# Patient Record
Sex: MALE | Race: WHITE | NOT HISPANIC OR LATINO | Employment: FULL TIME | URBAN - METROPOLITAN AREA
[De-identification: names, ages, dates, MRNs, and addresses within clinical notes are randomized per-mention and may not be internally consistent; named-entity substitution may affect disease eponyms.]

---

## 2023-02-23 PROBLEM — M25.562 BILATERAL KNEE PAIN: Status: ACTIVE | Noted: 2023-02-23

## 2023-02-23 PROBLEM — K63.5 POLYP OF COLON: Status: ACTIVE | Noted: 2023-02-23

## 2023-02-23 PROBLEM — R63.4 ABNORMAL WEIGHT LOSS: Status: ACTIVE | Noted: 2023-02-23

## 2023-02-23 PROBLEM — M25.561 BILATERAL KNEE PAIN: Status: ACTIVE | Noted: 2023-02-23

## 2023-02-23 PROBLEM — R11.2 NAUSEA WITH VOMITING: Status: ACTIVE | Noted: 2023-02-23

## 2023-02-23 PROBLEM — R05.9 COUGH: Status: ACTIVE | Noted: 2023-02-23

## 2023-02-23 PROBLEM — J20.9 ACUTE BRONCHITIS: Status: ACTIVE | Noted: 2023-02-23

## 2023-02-23 PROBLEM — S20.211S RIB CONTUSION, RIGHT, SEQUELA: Status: ACTIVE | Noted: 2023-02-23

## 2023-02-23 PROBLEM — K21.9 GERD (GASTROESOPHAGEAL REFLUX DISEASE): Status: ACTIVE | Noted: 2023-02-23

## 2023-02-23 PROBLEM — Z85.118 HISTORY OF LUNG CANCER: Status: ACTIVE | Noted: 2023-02-23

## 2023-02-23 PROBLEM — M54.9 BACK PAIN: Status: ACTIVE | Noted: 2023-02-23

## 2023-02-23 PROBLEM — J06.9 URI WITH COUGH AND CONGESTION: Status: ACTIVE | Noted: 2023-02-23

## 2023-02-23 PROBLEM — J43.9 COPD (CHRONIC OBSTRUCTIVE PULMONARY DISEASE) WITH EMPHYSEMA (MULTI): Status: ACTIVE | Noted: 2023-02-23

## 2023-02-23 PROBLEM — R10.9 ABDOMINAL PAIN OF MULTIPLE SITES: Status: ACTIVE | Noted: 2023-02-23

## 2023-02-23 PROBLEM — J45.909 ASTHMA (HHS-HCC): Status: ACTIVE | Noted: 2023-02-23

## 2023-02-23 PROBLEM — R68.89 FLU-LIKE SYMPTOMS: Status: ACTIVE | Noted: 2023-02-23

## 2023-02-23 PROBLEM — R09.81 NASAL CONGESTION: Status: ACTIVE | Noted: 2023-02-23

## 2023-02-23 PROBLEM — C34.90 LUNG CANCER (MULTI): Status: ACTIVE | Noted: 2023-02-23

## 2023-02-23 PROBLEM — R39.15 URINARY URGENCY: Status: ACTIVE | Noted: 2023-02-23

## 2023-02-23 RX ORDER — OMEPRAZOLE 20 MG/1
20 CAPSULE, DELAYED RELEASE ORAL
COMMUNITY
Start: 2022-03-24

## 2023-02-23 RX ORDER — LIDOCAINE 560 MG/1
PATCH PERCUTANEOUS; TOPICAL; TRANSDERMAL
COMMUNITY
End: 2024-01-23 | Stop reason: ALTCHOICE

## 2023-02-23 RX ORDER — FLUTICASONE FUROATE, UMECLIDINIUM BROMIDE AND VILANTEROL TRIFENATATE 200; 62.5; 25 UG/1; UG/1; UG/1
1 POWDER RESPIRATORY (INHALATION)
COMMUNITY
Start: 2022-06-27

## 2023-02-23 RX ORDER — MELOXICAM 15 MG/1
15 TABLET ORAL DAILY PRN
COMMUNITY
End: 2023-03-10

## 2023-03-10 ENCOUNTER — OFFICE VISIT (OUTPATIENT)
Dept: PRIMARY CARE | Facility: CLINIC | Age: 63
End: 2023-03-10
Payer: COMMERCIAL

## 2023-03-10 VITALS
HEART RATE: 91 BPM | HEIGHT: 72 IN | DIASTOLIC BLOOD PRESSURE: 79 MMHG | TEMPERATURE: 98.3 F | WEIGHT: 138 LBS | SYSTOLIC BLOOD PRESSURE: 133 MMHG | BODY MASS INDEX: 18.69 KG/M2 | OXYGEN SATURATION: 96 %

## 2023-03-10 DIAGNOSIS — S20.211S RIB CONTUSION, RIGHT, SEQUELA: Primary | ICD-10-CM

## 2023-03-10 PROCEDURE — 99212 OFFICE O/P EST SF 10 MIN: CPT | Performed by: STUDENT IN AN ORGANIZED HEALTH CARE EDUCATION/TRAINING PROGRAM

## 2023-03-10 RX ORDER — LIDOCAINE 560 MG/1
PATCH PERCUTANEOUS; TOPICAL; TRANSDERMAL
COMMUNITY
Start: 2023-02-21 | End: 2024-01-23 | Stop reason: ALTCHOICE

## 2023-03-10 ASSESSMENT — ENCOUNTER SYMPTOMS
WEAKNESS: 0
ABDOMINAL PAIN: 0
COUGH: 0
PALPITATIONS: 0
FEVER: 0
FATIGUE: 0
CHILLS: 0
VOMITING: 0
SHORTNESS OF BREATH: 0
NUMBNESS: 0
WHEEZING: 0
CHEST TIGHTNESS: 0
NAUSEA: 0

## 2023-03-10 NOTE — PROGRESS NOTES
Subjective   Patient ID: Marshall Silva is a 62 y.o. male who presents for Rib Injury (No longer [facility] case. /DOI 02/09/2023 Right side./No pain or concerns at moment. ).    HPI   Here for follow up on right sided rib pain.   Pain is almost fully resolved. He is back to work, taking it easy when needed.   Denies chest pain, SOB, weakness.     Review of Systems   Constitutional:  Negative for chills, fatigue and fever.   Respiratory:  Negative for cough, chest tightness, shortness of breath and wheezing.    Cardiovascular:  Negative for chest pain and palpitations.   Gastrointestinal:  Negative for abdominal pain, nausea and vomiting.   Musculoskeletal:         Mild intermittent right rib pain   Neurological:  Negative for weakness and numbness.       Objective   /79 (BP Location: Right arm, Patient Position: Sitting, BP Cuff Size: Adult)   Pulse 91   Temp 36.8 °C (98.3 °F) (Temporal)   Ht 1.829 m (6')   Wt 62.6 kg (138 lb)   SpO2 96%   BMI 18.72 kg/m²     Physical Exam  Vitals and nursing note reviewed.   Constitutional:       General: He is not in acute distress.     Appearance: Normal appearance. He is not ill-appearing or toxic-appearing.   HENT:      Head: Normocephalic and atraumatic.   Cardiovascular:      Rate and Rhythm: Normal rate and regular rhythm.      Pulses: Normal pulses.      Heart sounds: Normal heart sounds.   Pulmonary:      Effort: Pulmonary effort is normal. No respiratory distress.      Breath sounds: Normal breath sounds. No wheezing, rhonchi or rales.   Chest:      Chest wall: No tenderness.   Abdominal:      General: Abdomen is flat.      Palpations: Abdomen is soft.   Musculoskeletal:         General: Normal range of motion.      Cervical back: Normal range of motion and neck supple.   Skin:     General: Skin is warm and dry.   Neurological:      General: No focal deficit present.      Mental Status: He is alert and oriented to person, place, and time. Mental status is at  baseline.      Cranial Nerves: No cranial nerve deficit.      Sensory: No sensory deficit.      Motor: No weakness.   Psychiatric:         Mood and Affect: Mood normal.         Behavior: Behavior normal.         Assessment/Plan   Diagnoses and all orders for this visit:  Rib contusion, right, sequela  - Pt doing well following injury to right ribcage. On exam today strength, sensation and ROM. Pt is back to work, will clear for full duty. To take it easy as needed if pain returns.   Follow up as needed with concerns.   Discussed indications to present to the ED such as chest pain, SOB, worsening pain, bowel or bladder incontinence, saddle paraesthesia, lower extremity weakness, or any concerning symptom he may have.

## 2023-03-10 NOTE — PROGRESS NOTES
I reviewed with the resident the medical history and the resident’s findings on physical examination.  I discussed with the resident the patient’s diagnosis and concur with the treatment plan as documented in the resident note.     Isaac Guerrero, DO

## 2023-03-16 ENCOUNTER — TELEPHONE (OUTPATIENT)
Dept: PRIMARY CARE | Facility: CLINIC | Age: 63
End: 2023-03-16
Payer: COMMERCIAL

## 2023-03-16 DIAGNOSIS — M25.562 CHRONIC PAIN OF BOTH KNEES: Primary | ICD-10-CM

## 2023-03-16 DIAGNOSIS — G89.29 CHRONIC PAIN OF BOTH KNEES: Primary | ICD-10-CM

## 2023-03-16 DIAGNOSIS — M25.561 CHRONIC PAIN OF BOTH KNEES: Primary | ICD-10-CM

## 2023-03-17 NOTE — TELEPHONE ENCOUNTER
At last visit, patient complained of b/l knee pain with concern for OA. Discussed possible knee xrays. Pt called asking for orders for imaging. Orders placed.    Ratna Harris, DO

## 2023-03-20 ENCOUNTER — TELEPHONE (OUTPATIENT)
Dept: PRIMARY CARE | Facility: CLINIC | Age: 63
End: 2023-03-20
Payer: COMMERCIAL

## 2023-03-23 NOTE — TELEPHONE ENCOUNTER
----- Message from Dillon Mayorga DO sent at 3/18/2023 11:11 AM EDT -----  X-ray showed mild osteoarthritis in the bilateral knees

## 2023-03-24 ENCOUNTER — OFFICE VISIT (OUTPATIENT)
Dept: PRIMARY CARE | Facility: CLINIC | Age: 63
End: 2023-03-24
Payer: COMMERCIAL

## 2023-03-24 VITALS
HEART RATE: 97 BPM | HEIGHT: 72 IN | OXYGEN SATURATION: 93 % | BODY MASS INDEX: 18.56 KG/M2 | DIASTOLIC BLOOD PRESSURE: 78 MMHG | SYSTOLIC BLOOD PRESSURE: 120 MMHG | TEMPERATURE: 98.2 F | WEIGHT: 137 LBS

## 2023-03-24 DIAGNOSIS — M17.0 PRIMARY OSTEOARTHRITIS OF BOTH KNEES: ICD-10-CM

## 2023-03-24 DIAGNOSIS — M25.562 CHRONIC PAIN OF BOTH KNEES: Primary | ICD-10-CM

## 2023-03-24 DIAGNOSIS — G89.29 CHRONIC PAIN OF BOTH KNEES: Primary | ICD-10-CM

## 2023-03-24 DIAGNOSIS — M25.561 CHRONIC PAIN OF BOTH KNEES: Primary | ICD-10-CM

## 2023-03-24 PROCEDURE — 20611 DRAIN/INJ JOINT/BURSA W/US: CPT | Performed by: STUDENT IN AN ORGANIZED HEALTH CARE EDUCATION/TRAINING PROGRAM

## 2023-03-24 PROCEDURE — 99213 OFFICE O/P EST LOW 20 MIN: CPT | Performed by: STUDENT IN AN ORGANIZED HEALTH CARE EDUCATION/TRAINING PROGRAM

## 2023-03-24 RX ORDER — METHYLPREDNISOLONE ACETATE 40 MG/ML
80 INJECTION, SUSPENSION INTRA-ARTICULAR; INTRALESIONAL; INTRAMUSCULAR; SOFT TISSUE
Status: COMPLETED | OUTPATIENT
Start: 2023-03-24 | End: 2023-03-24

## 2023-03-24 RX ORDER — LIDOCAINE HYDROCHLORIDE 20 MG/ML
3 INJECTION, SOLUTION INFILTRATION; PERINEURAL
Status: COMPLETED | OUTPATIENT
Start: 2023-03-24 | End: 2023-03-24

## 2023-03-24 RX ADMIN — METHYLPREDNISOLONE ACETATE 80 MG: 40 INJECTION, SUSPENSION INTRA-ARTICULAR; INTRALESIONAL; INTRAMUSCULAR; SOFT TISSUE at 17:40

## 2023-03-24 RX ADMIN — LIDOCAINE HYDROCHLORIDE 3 ML: 20 INJECTION, SOLUTION INFILTRATION; PERINEURAL at 17:40

## 2023-03-24 ASSESSMENT — ENCOUNTER SYMPTOMS
DIZZINESS: 0
AGITATION: 0
SHORTNESS OF BREATH: 0
CHILLS: 0
COUGH: 0
FATIGUE: 0
FEVER: 0
STRIDOR: 0
BRUISES/BLEEDS EASILY: 0
LIGHT-HEADEDNESS: 0
WHEEZING: 0
ARTHRALGIAS: 1
WEAKNESS: 0
NUMBNESS: 0
CONFUSION: 0
JOINT SWELLING: 0

## 2023-03-24 NOTE — PROGRESS NOTES
Subjective   Patient ID: Marshall Silva is a 62 y.o. male who presents for Knee Pain (Patient feels knee is unstable /Pain when going up stairs. ) and Hip Pain (Right hip).    Patient presenting for follow-up for bilateral knee pain.  States he has had pain for several months to years and has been worsening recently.  States it is worse when standing for long periods of time or going up steps.  He states he occasionally feels unsteady on his knees as well.  Denies any new trauma to the knees or any catching.  He denies any significant swelling over knees bilaterally.  Underwent x-rays of knees which revealed mild osteoarthritis bilaterally without any other osseous abnormalities.  Patient has not been taking medications for his symptoms nor has he been following any specific exercise regimen at this time.  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  Denies any new numbness, tingling, or weakness in bilateral lower extremities.         Review of Systems   Constitutional:  Negative for chills, fatigue and fever.   Eyes:  Negative for visual disturbance.   Respiratory:  Negative for cough, shortness of breath, wheezing and stridor.    Cardiovascular:  Negative for chest pain and leg swelling.   Musculoskeletal:  Positive for arthralgias. Negative for joint swelling.   Skin:  Negative for rash.   Neurological:  Negative for dizziness, weakness, light-headedness and numbness.   Hematological:  Does not bruise/bleed easily.   Psychiatric/Behavioral:  Negative for agitation, behavioral problems and confusion.        Objective   /78 (BP Location: Right arm, Patient Position: Sitting, BP Cuff Size: Adult)   Pulse 97   Temp 36.8 °C (98.2 °F) (Temporal)   Ht 1.829 m (6')   Wt 62.1 kg (137 lb)   SpO2 93%   BMI 18.58 kg/m²     Physical Exam  Vitals and nursing note reviewed.   Constitutional:       General: He is not in acute  distress.     Appearance: Normal appearance. He is not toxic-appearing.   HENT:      Head: Normocephalic and atraumatic.      Right Ear: External ear normal.      Left Ear: External ear normal.      Nose: Nose normal.      Mouth/Throat:      Mouth: Mucous membranes are moist.      Pharynx: Oropharynx is clear.   Eyes:      Extraocular Movements: Extraocular movements intact.      Conjunctiva/sclera: Conjunctivae normal.      Pupils: Pupils are equal, round, and reactive to light.   Cardiovascular:      Rate and Rhythm: Normal rate and regular rhythm.      Pulses: Normal pulses.      Heart sounds: Normal heart sounds.   Pulmonary:      Effort: Pulmonary effort is normal.      Breath sounds: Normal breath sounds.   Abdominal:      General: Abdomen is flat. There is no distension.      Palpations: Abdomen is soft.   Musculoskeletal:         General: No swelling or signs of injury.      Cervical back: Normal range of motion.      Right lower leg: No edema.      Left lower leg: No edema.      Comments: Pain with ambulation and knees bilaterally.  Range of motion and deep flexion slightly limited due to pain in both knees. Normal strength and sensation in bilateral lower extremities. No significant ligamentous laxity on physical exam.   Skin:     General: Skin is warm and dry.      Coloration: Skin is not jaundiced or pale.   Neurological:      General: No focal deficit present.      Mental Status: He is alert and oriented to person, place, and time.   Psychiatric:         Mood and Affect: Mood normal.         Behavior: Behavior normal.         Thought Content: Thought content normal.         Judgment: Judgment normal.         Assessment/Plan   Problem List Items Addressed This Visit          Other    Bilateral knee pain - Primary    Relevant Orders    Referral to Physical Therapy    Joint Injection Large/Arthrocentesis: bilateral knee     Other Visit Diagnoses       Primary osteoarthritis of both knees        Relevant  Orders    Referral to Physical Therapy    Joint Injection Large/Arthrocentesis: bilateral knee        Patient ID: Marshall Silva is a 62 y.o. male.      Joint Injection Large/Arthrocentesis: bilateral knee on 3/24/2023 5:40 PM  Indications: pain  Details: 22 G needle, ultrasound-guided superolateral approach  Medications (Right): 80 mg methylPREDNISolone acetate 40 mg/mL; 3 mL lidocaine 20 mg/mL (2 %)  Medications (Left): 80 mg methylPREDNISolone acetate 40 mg/mL; 3 mL lidocaine 20 mg/mL (2 %)  Outcome: tolerated well, no immediate complications  Procedure, treatment alternatives, risks and benefits explained, specific risks discussed. Consent was given by the patient. Patient was prepped and draped in the usual sterile fashion.       X-ray of bilateral knees revealed mild osteoarthritis without other osseous abnormalities.  Discussed conservative management at this time including corticosteroid injections which patient was interested.  Bilateral corticosteroid injections done in office today under ultrasound.  Patient tolerated well.  Physical therapy referral provided for instability of knees bilaterally.  Patient may take Tylenol as needed for knee pain.  Advised patient to follow-up within the next 2 to 4 weeks if no significant improvement of pain as he may benefit from MRI to further investigate instability of knees, though no significant ligamentous laxity appreciated on exam.  Plan for follow-up as needed if pain has improved with current intervention.  Patient should continue to follow-up with physical therapy as recommended by PT.

## 2023-03-25 NOTE — PROGRESS NOTES
I was present for the entirety of the procedure(s).  I saw and evaluated the patient. I personally obtained the key and critical portions of the history and physical exam or was physically present for key and critical portions performed by the resident/fellow. I reviewed the resident/fellow's documentation and discussed the patient with the resident/fellow. I agree with the resident/fellow's medical decision making as documented in the note.    Asa Paulson, DO

## 2024-01-23 ENCOUNTER — TELEPHONE (OUTPATIENT)
Dept: PRIMARY CARE | Facility: CLINIC | Age: 64
End: 2024-01-23

## 2024-01-23 ENCOUNTER — OFFICE VISIT (OUTPATIENT)
Dept: PRIMARY CARE | Facility: CLINIC | Age: 64
End: 2024-01-23
Payer: COMMERCIAL

## 2024-01-23 ENCOUNTER — APPOINTMENT (OUTPATIENT)
Dept: LAB | Facility: LAB | Age: 64
End: 2024-01-23
Payer: COMMERCIAL

## 2024-01-23 VITALS
HEIGHT: 72 IN | DIASTOLIC BLOOD PRESSURE: 63 MMHG | SYSTOLIC BLOOD PRESSURE: 117 MMHG | OXYGEN SATURATION: 99 % | BODY MASS INDEX: 18.01 KG/M2 | WEIGHT: 133 LBS | HEART RATE: 58 BPM | RESPIRATION RATE: 18 BRPM | TEMPERATURE: 98.4 F

## 2024-01-23 DIAGNOSIS — M62.81 PROXIMAL MUSCLE WEAKNESS: ICD-10-CM

## 2024-01-23 DIAGNOSIS — R39.15 URINARY URGENCY: ICD-10-CM

## 2024-01-23 DIAGNOSIS — Z85.118 HISTORY OF LUNG CANCER: ICD-10-CM

## 2024-01-23 DIAGNOSIS — G25.3 MYOCLONUS: ICD-10-CM

## 2024-01-23 DIAGNOSIS — Z87.891 PERSONAL HISTORY OF NICOTINE DEPENDENCE: ICD-10-CM

## 2024-01-23 DIAGNOSIS — M62.89 DECREASED MUSCLE TONE: ICD-10-CM

## 2024-01-23 DIAGNOSIS — Z00.00 PREVENTATIVE HEALTH CARE: Primary | ICD-10-CM

## 2024-01-23 LAB
POC APPEARANCE, URINE: CLEAR
POC BILIRUBIN, URINE: NEGATIVE
POC BLOOD, URINE: NEGATIVE
POC COLOR, URINE: YELLOW
POC GLUCOSE, URINE: NEGATIVE MG/DL
POC KETONES, URINE: NEGATIVE MG/DL
POC LEUKOCYTES, URINE: NEGATIVE
POC NITRITE,URINE: NEGATIVE
POC PH, URINE: 7.5 PH
POC PROTEIN, URINE: NEGATIVE MG/DL
POC SPECIFIC GRAVITY, URINE: 1.01
POC UROBILINOGEN, URINE: 0.2 EU/DL

## 2024-01-23 PROCEDURE — 99214 OFFICE O/P EST MOD 30 MIN: CPT

## 2024-01-23 PROCEDURE — 81002 URINALYSIS NONAUTO W/O SCOPE: CPT

## 2024-01-23 PROCEDURE — 99396 PREV VISIT EST AGE 40-64: CPT

## 2024-01-23 SDOH — ECONOMIC STABILITY: GENERAL
WHICH OF THE FOLLOWING DO YOU KNOW HOW TO USE AND HAVE ACCESS TO EVERY DAY? (CHOOSE ALL THAT APPLY): DESKTOP COMPUTER, LAPTOP COMPUTER, OR TABLET WITH BROADBAND INTERNET CONNECTION;SMARTPHONE WITH CELLULAR DATA PLAN

## 2024-01-23 SDOH — ECONOMIC STABILITY: HOUSING INSECURITY
IN THE LAST 12 MONTHS, WAS THERE A TIME WHEN YOU DID NOT HAVE A STEADY PLACE TO SLEEP OR SLEPT IN A SHELTER (INCLUDING NOW)?: NO

## 2024-01-23 SDOH — ECONOMIC STABILITY: FOOD INSECURITY: WITHIN THE PAST 12 MONTHS, YOU WORRIED THAT YOUR FOOD WOULD RUN OUT BEFORE YOU GOT MONEY TO BUY MORE.: NEVER TRUE

## 2024-01-23 SDOH — HEALTH STABILITY: PHYSICAL HEALTH: ON AVERAGE, HOW MANY MINUTES DO YOU ENGAGE IN EXERCISE AT THIS LEVEL?: 60 MIN

## 2024-01-23 SDOH — ECONOMIC STABILITY: TRANSPORTATION INSECURITY
IN THE PAST 12 MONTHS, HAS LACK OF TRANSPORTATION KEPT YOU FROM MEETINGS, WORK, OR FROM GETTING THINGS NEEDED FOR DAILY LIVING?: NO

## 2024-01-23 SDOH — ECONOMIC STABILITY: FOOD INSECURITY: WITHIN THE PAST 12 MONTHS, THE FOOD YOU BOUGHT JUST DIDN'T LAST AND YOU DIDN'T HAVE MONEY TO GET MORE.: NEVER TRUE

## 2024-01-23 SDOH — HEALTH STABILITY: PHYSICAL HEALTH: ON AVERAGE, HOW MANY DAYS PER WEEK DO YOU ENGAGE IN MODERATE TO STRENUOUS EXERCISE (LIKE A BRISK WALK)?: 7 DAYS

## 2024-01-23 SDOH — ECONOMIC STABILITY: TRANSPORTATION INSECURITY
IN THE PAST 12 MONTHS, HAS THE LACK OF TRANSPORTATION KEPT YOU FROM MEDICAL APPOINTMENTS OR FROM GETTING MEDICATIONS?: NO

## 2024-01-23 SDOH — ECONOMIC STABILITY: INCOME INSECURITY: IN THE LAST 12 MONTHS, WAS THERE A TIME WHEN YOU WERE NOT ABLE TO PAY THE MORTGAGE OR RENT ON TIME?: NO

## 2024-01-23 ASSESSMENT — ENCOUNTER SYMPTOMS
HEADACHES: 0
WEAKNESS: 1
LOSS OF SENSATION IN FEET: 0
OCCASIONAL FEELINGS OF UNSTEADINESS: 0
VOMITING: 0
DYSURIA: 0
NAUSEA: 0
DIARRHEA: 0
JOINT SWELLING: 0
LIGHT-HEADEDNESS: 0
NUMBNESS: 0
SHORTNESS OF BREATH: 0
TREMORS: 0
ABDOMINAL DISTENTION: 0
DEPRESSION: 0
DIZZINESS: 0
DIFFICULTY URINATING: 1
ABDOMINAL PAIN: 0
FEVER: 0
BACK PAIN: 1
CONSTIPATION: 0

## 2024-01-23 ASSESSMENT — SOCIAL DETERMINANTS OF HEALTH (SDOH)
HOW OFTEN DO YOU ATTEND CHURCH OR RELIGIOUS SERVICES?: NEVER
WITHIN THE LAST YEAR, HAVE YOU BEEN HUMILIATED OR EMOTIONALLY ABUSED IN OTHER WAYS BY YOUR PARTNER OR EX-PARTNER?: NO
WITHIN THE LAST YEAR, HAVE TO BEEN RAPED OR FORCED TO HAVE ANY KIND OF SEXUAL ACTIVITY BY YOUR PARTNER OR EX-PARTNER?: NO
IN THE PAST 12 MONTHS, HAS THE ELECTRIC, GAS, OIL, OR WATER COMPANY THREATENED TO SHUT OFF SERVICE IN YOUR HOME?: NO
HOW OFTEN DO YOU ATTENT MEETINGS OF THE CLUB OR ORGANIZATION YOU BELONG TO?: NEVER
DO YOU BELONG TO ANY CLUBS OR ORGANIZATIONS SUCH AS CHURCH GROUPS UNIONS, FRATERNAL OR ATHLETIC GROUPS, OR SCHOOL GROUPS?: NO
WITHIN THE LAST YEAR, HAVE YOU BEEN AFRAID OF YOUR PARTNER OR EX-PARTNER?: NO
IN A TYPICAL WEEK, HOW MANY TIMES DO YOU TALK ON THE PHONE WITH FAMILY, FRIENDS, OR NEIGHBORS?: MORE THAN THREE TIMES A WEEK
WITHIN THE LAST YEAR, HAVE YOU BEEN KICKED, HIT, SLAPPED, OR OTHERWISE PHYSICALLY HURT BY YOUR PARTNER OR EX-PARTNER?: NO
HOW HARD IS IT FOR YOU TO PAY FOR THE VERY BASICS LIKE FOOD, HOUSING, MEDICAL CARE, AND HEATING?: NOT HARD AT ALL

## 2024-01-23 ASSESSMENT — PATIENT HEALTH QUESTIONNAIRE - PHQ9
2. FEELING DOWN, DEPRESSED OR HOPELESS: NOT AT ALL
1. LITTLE INTEREST OR PLEASURE IN DOING THINGS: NOT AT ALL
SUM OF ALL RESPONSES TO PHQ9 QUESTIONS 1 & 2: 0

## 2024-01-23 ASSESSMENT — LIFESTYLE VARIABLES
HOW MANY STANDARD DRINKS CONTAINING ALCOHOL DO YOU HAVE ON A TYPICAL DAY: PATIENT DOES NOT DRINK
HOW OFTEN DO YOU HAVE A DRINK CONTAINING ALCOHOL: NEVER
SKIP TO QUESTIONS 9-10: 1
HOW OFTEN DO YOU HAVE SIX OR MORE DRINKS ON ONE OCCASION: NEVER
AUDIT-C TOTAL SCORE: 0

## 2024-01-23 NOTE — PROGRESS NOTES
Subjective   Marshall Silva is a 63 y.o. male who is here for a routine exam and routine preventative care screening.  He is also here with several acute complaints first and foremost he noticed that over the past year has had progressive weakness in his legs and difficulty walking and standing for extended periods of time.  Says that his legs shake back-and-forth when he rises from seated to standing and when standing on his feet.  He said he has had multiple falls due to this kind of weakness.  He does not take any medications for pain consulted in the past he received an ICS to his knees bilaterally which helped for a small amount of time but does not help with his instability.  He denies numbness, tingling, paresthesias, syncope, saddle anesthesia, loss of bowel or bladder incontinence.  He also reports that he has been having frequent nocturia and incomplete bladder emptying for the past year.  He also would like to discuss smoking cessation as he has a history of lung cancer status post surgical resection.  Also needs CT scan of his lungs to check for nodules.  Says he does not follow with a pulmonologist    Review of Systems   Constitutional:  Negative for fever.   Respiratory:  Negative for shortness of breath.    Cardiovascular:  Negative for chest pain.   Gastrointestinal:  Negative for abdominal distention, abdominal pain, constipation, diarrhea, nausea and vomiting.   Genitourinary:  Positive for difficulty urinating. Negative for decreased urine volume, dysuria and urgency.   Musculoskeletal:  Positive for back pain and gait problem. Negative for joint swelling.   Neurological:  Positive for weakness. Negative for dizziness, tremors, light-headedness, numbness and headaches.   All other systems reviewed and are negative.      Objective   /63 (BP Location: Right arm, Patient Position: Sitting)   Pulse 58   Temp 36.9 °C (98.4 °F)   Resp 18   Ht 1.829 m (6')   Wt 60.3 kg (133 lb)   SpO2 99%    BMI 18.04 kg/m²     Physical Exam  Vitals reviewed.   Constitutional:       General: He is not in acute distress.     Appearance: Normal appearance. He is not toxic-appearing.   HENT:      Head: Normocephalic and atraumatic.      Nose: Nose normal.   Eyes:      Extraocular Movements: Extraocular movements intact.   Cardiovascular:      Rate and Rhythm: Normal rate and regular rhythm.      Heart sounds: No murmur heard.     No friction rub. No gallop.   Pulmonary:      Effort: Pulmonary effort is normal. No respiratory distress.      Breath sounds: Normal breath sounds. No wheezing, rhonchi or rales.   Musculoskeletal:         General: No swelling or tenderness.      Cervical back: Normal range of motion.      Right lower leg: No edema.      Left lower leg: No edema.      Comments: Patient goes from sitting to standing his legs shaking and buckling.  This movement pattern is reproduced with strength testing against resistance. His gait is unsteady with ambulation   Skin:     General: Skin is warm and dry.   Neurological:      General: No focal deficit present.      Mental Status: He is alert.   Psychiatric:         Mood and Affect: Mood normal.         Behavior: Behavior normal.         Assessment/Plan patient has a very complicated medical presentation and concerning past medical history of lung cancer.  Due to his health maintenance screening we have ordered basic lab work CBC, CMP, lipids, TSH, hep C screening, HIV screening, PSA.  His more concerning complaint and presentation of lower extremity weakness and gait instability requires in-depth investigation including broad rheumatologic workup the ordered labs seen below.  Additionally we have ordered stat MRI of the brain and lumbar spine.  He is also been given referral to neurology told him to schedule this appointment as soon as possible.  Have also ordered CT without contrast due to his history of lung cancer status post surgical resection.  Also given  referral to pulmonology follow-up with his history of lung cancer.  We reiterated the urgency of patient getting these workup completed as soon as possible as his presentation is concerning and could be due to a variety of different pathologies.  Problem List Items Addressed This Visit       History of lung cancer    Relevant Orders    CT chest wo IV contrast    MR brain w and wo IV contrast    MR lumbar spine wo IV contrast    Referral to Pulmonology    Urinary urgency    Relevant Orders    Prostate Spec.Ag,Screen    MR brain w and wo IV contrast    MR lumbar spine wo IV contrast    Proximal muscle weakness    Relevant Orders    Hepatitis C antibody    TSH with reflex to Free T4 if abnormal    POCT UA (nonautomated) manually resulted (Completed)    Referral to Physical Therapy    ROSHNI    Sedimentation Rate    Anti-PETEY-1 Antibody IgG    Anti-SSA    Anti-SSB    ANCA-Associated Vasculitis Profile (ANCA,MPO,PR3)    Hepatitis B surface antibody    Cryoglobulin    Vitamin D 25-Hydroxy,Total (for eval of Vitamin D levels)    Anti-Smooth Muscle Antibody    Creatine Kinase    MR brain w and wo IV contrast    MR lumbar spine wo IV contrast    Referral to Neurology     Other Visit Diagnoses       Preventative health care    -  Primary    Relevant Orders    CBC    Comprehensive metabolic panel    Lipid panel    Prostate Spec.Ag,Screen    HIV 1/2 Antigen/Antibody Screen with Reflex to Confirmation    TSH with reflex to Free T4 if abnormal    POCT UA (nonautomated) manually resulted (Completed)    Hepatitis B surface antibody    Personal history of nicotine dependence        Myoclonus        Relevant Orders    MR brain w and wo IV contrast    MR lumbar spine wo IV contrast    Decreased muscle tone        Relevant Orders    MR brain w and wo IV contrast    MR lumbar spine wo IV contrast            Reviewed Social Determinants of health with patient, discussed healthy lifestyle including 150 minutes of physical activity per  week  Ordered/Reviewed baseline labwork   Immunizations Up-to-Date

## 2024-01-24 ENCOUNTER — HOSPITAL ENCOUNTER (OUTPATIENT)
Dept: RADIOLOGY | Facility: CLINIC | Age: 64
Discharge: HOME | End: 2024-01-24
Payer: COMMERCIAL

## 2024-01-24 ENCOUNTER — TELEPHONE (OUTPATIENT)
Dept: PRIMARY CARE | Facility: CLINIC | Age: 64
End: 2024-01-24

## 2024-01-24 ENCOUNTER — LAB (OUTPATIENT)
Dept: LAB | Facility: LAB | Age: 64
End: 2024-01-24
Payer: COMMERCIAL

## 2024-01-24 DIAGNOSIS — M62.89 DECREASED MUSCLE TONE: ICD-10-CM

## 2024-01-24 DIAGNOSIS — M62.81 PROXIMAL MUSCLE WEAKNESS: ICD-10-CM

## 2024-01-24 DIAGNOSIS — Z85.118 HISTORY OF LUNG CANCER: ICD-10-CM

## 2024-01-24 DIAGNOSIS — G25.3 MYOCLONUS: ICD-10-CM

## 2024-01-24 DIAGNOSIS — M51.36 BULGING OF LUMBAR INTERVERTEBRAL DISC: Primary | ICD-10-CM

## 2024-01-24 DIAGNOSIS — R39.15 URINARY URGENCY: ICD-10-CM

## 2024-01-24 DIAGNOSIS — M62.81 PROXIMAL MUSCLE WEAKNESS: Primary | ICD-10-CM

## 2024-01-24 DIAGNOSIS — Z00.00 PREVENTATIVE HEALTH CARE: ICD-10-CM

## 2024-01-24 PROBLEM — M51.369 BULGING OF LUMBAR INTERVERTEBRAL DISC: Status: ACTIVE | Noted: 2024-01-24

## 2024-01-24 LAB
25(OH)D3 SERPL-MCNC: 17 NG/ML (ref 30–100)
ALBUMIN SERPL BCP-MCNC: 4.4 G/DL (ref 3.4–5)
ALP SERPL-CCNC: 48 U/L (ref 33–136)
ALT SERPL W P-5'-P-CCNC: 13 U/L (ref 10–52)
ANION GAP SERPL CALC-SCNC: 12 MMOL/L (ref 10–20)
AST SERPL W P-5'-P-CCNC: 16 U/L (ref 9–39)
BILIRUB SERPL-MCNC: 0.6 MG/DL (ref 0–1.2)
BUN SERPL-MCNC: 11 MG/DL (ref 6–23)
CALCIUM SERPL-MCNC: 9.5 MG/DL (ref 8.6–10.3)
CHLORIDE SERPL-SCNC: 100 MMOL/L (ref 98–107)
CHOLEST SERPL-MCNC: 163 MG/DL (ref 0–199)
CHOLESTEROL/HDL RATIO: 1.9
CK SERPL-CCNC: 63 U/L (ref 0–325)
CO2 SERPL-SCNC: 32 MMOL/L (ref 21–32)
CREAT SERPL-MCNC: 0.77 MG/DL (ref 0.5–1.3)
EGFRCR SERPLBLD CKD-EPI 2021: >90 ML/MIN/1.73M*2
ERYTHROCYTE [DISTWIDTH] IN BLOOD BY AUTOMATED COUNT: 13.1 % (ref 11.5–14.5)
ERYTHROCYTE [SEDIMENTATION RATE] IN BLOOD BY WESTERGREN METHOD: <1 MM/H (ref 0–20)
GLUCOSE SERPL-MCNC: 90 MG/DL (ref 74–99)
HBV SURFACE AB SER-ACNC: <3.1 MIU/ML
HCT VFR BLD AUTO: 44.3 % (ref 41–52)
HCV AB SER QL: NONREACTIVE
HDLC SERPL-MCNC: 85 MG/DL
HGB BLD-MCNC: 14.8 G/DL (ref 13.5–17.5)
HIV 1+2 AB+HIV1 P24 AG SERPL QL IA: NONREACTIVE
LDLC SERPL CALC-MCNC: 65 MG/DL
MCH RBC QN AUTO: 33.7 PG (ref 26–34)
MCHC RBC AUTO-ENTMCNC: 33.4 G/DL (ref 32–36)
MCV RBC AUTO: 101 FL (ref 80–100)
NON HDL CHOLESTEROL: 78 MG/DL (ref 0–149)
NRBC BLD-RTO: 0 /100 WBCS (ref 0–0)
PLATELET # BLD AUTO: 270 X10*3/UL (ref 150–450)
POTASSIUM SERPL-SCNC: 5 MMOL/L (ref 3.5–5.3)
PROT SERPL-MCNC: 7.2 G/DL (ref 6.4–8.2)
PSA SERPL-MCNC: 0.28 NG/ML
RBC # BLD AUTO: 4.39 X10*6/UL (ref 4.5–5.9)
SODIUM SERPL-SCNC: 139 MMOL/L (ref 136–145)
TRIGL SERPL-MCNC: 67 MG/DL (ref 0–149)
TSH SERPL-ACNC: 0.51 MIU/L (ref 0.44–3.98)
VLDL: 13 MG/DL (ref 0–40)
WBC # BLD AUTO: 6.5 X10*3/UL (ref 4.4–11.3)

## 2024-01-24 PROCEDURE — 86235 NUCLEAR ANTIGEN ANTIBODY: CPT

## 2024-01-24 PROCEDURE — 86038 ANTINUCLEAR ANTIBODIES: CPT

## 2024-01-24 PROCEDURE — 86015 ACTIN ANTIBODY EACH: CPT

## 2024-01-24 PROCEDURE — 82306 VITAMIN D 25 HYDROXY: CPT

## 2024-01-24 PROCEDURE — 86036 ANCA SCREEN EACH ANTIBODY: CPT

## 2024-01-24 PROCEDURE — 84443 ASSAY THYROID STIM HORMONE: CPT

## 2024-01-24 PROCEDURE — 72148 MRI LUMBAR SPINE W/O DYE: CPT | Performed by: RADIOLOGY

## 2024-01-24 PROCEDURE — 70553 MRI BRAIN STEM W/O & W/DYE: CPT

## 2024-01-24 PROCEDURE — 86803 HEPATITIS C AB TEST: CPT

## 2024-01-24 PROCEDURE — 82550 ASSAY OF CK (CPK): CPT

## 2024-01-24 PROCEDURE — 36415 COLL VENOUS BLD VENIPUNCTURE: CPT

## 2024-01-24 PROCEDURE — 84153 ASSAY OF PSA TOTAL: CPT

## 2024-01-24 PROCEDURE — 80061 LIPID PANEL: CPT

## 2024-01-24 PROCEDURE — 2550000001 HC RX 255 CONTRASTS: Performed by: FAMILY MEDICINE

## 2024-01-24 PROCEDURE — 85027 COMPLETE CBC AUTOMATED: CPT

## 2024-01-24 PROCEDURE — 83516 IMMUNOASSAY NONANTIBODY: CPT

## 2024-01-24 PROCEDURE — 82595 ASSAY OF CRYOGLOBULIN: CPT

## 2024-01-24 PROCEDURE — 86706 HEP B SURFACE ANTIBODY: CPT

## 2024-01-24 PROCEDURE — 85652 RBC SED RATE AUTOMATED: CPT

## 2024-01-24 PROCEDURE — A9575 INJ GADOTERATE MEGLUMI 0.1ML: HCPCS | Performed by: FAMILY MEDICINE

## 2024-01-24 PROCEDURE — 70553 MRI BRAIN STEM W/O & W/DYE: CPT | Performed by: RADIOLOGY

## 2024-01-24 PROCEDURE — 87389 HIV-1 AG W/HIV-1&-2 AB AG IA: CPT

## 2024-01-24 PROCEDURE — 80053 COMPREHEN METABOLIC PANEL: CPT

## 2024-01-24 PROCEDURE — 72148 MRI LUMBAR SPINE W/O DYE: CPT

## 2024-01-24 RX ORDER — GADOTERATE MEGLUMINE 376.9 MG/ML
12 INJECTION INTRAVENOUS
Status: COMPLETED | OUTPATIENT
Start: 2024-01-24 | End: 2024-01-24

## 2024-01-24 RX ADMIN — GADOTERATE MEGLUMINE 12 ML: 376.9 INJECTION INTRAVENOUS at 11:28

## 2024-01-24 NOTE — PROGRESS NOTES
I saw and evaluated the patient. I personally obtained the key and critical portions of the history and physical exam or was physically present for key and critical portions performed by the resident/fellow. I reviewed the resident/fellow's documentation and discussed the patient with the resident/fellow. I agree with the resident/fellow's medical decision making as documented in the note.    Zahida Blake DO    Patient presented for a cpe but had multiple concerning complaints.    He has a history of lung cancer with partial lobectomy and no follow up  A history of slipped discs in neck and low back and has refused surgery  He has progressive muscle wasting, dystonia, weakness and antalgic gait.  He has clonus of his feet as well.  There are multiple concerns that we have today regarding his brain (possible mets?) vs spinal cord injuries vs inflammatory myositis.    We have ordered a multitude of labs as well as requested STAT imaging due to the fact that he is only in town for a few days before he travels again for work.  Hopeful that we can get as much of a workup done as possible and get him in with a neurologist, etc. In a reasonable time.

## 2024-01-25 ENCOUNTER — HOSPITAL ENCOUNTER (OUTPATIENT)
Dept: RADIOLOGY | Facility: CLINIC | Age: 64
Discharge: HOME | End: 2024-01-25
Payer: COMMERCIAL

## 2024-01-25 ENCOUNTER — APPOINTMENT (OUTPATIENT)
Dept: NEUROSURGERY | Facility: CLINIC | Age: 64
End: 2024-01-25
Payer: COMMERCIAL

## 2024-01-25 DIAGNOSIS — Z85.118 HISTORY OF LUNG CANCER: ICD-10-CM

## 2024-01-25 DIAGNOSIS — M62.89 DECREASED MUSCLE TONE: ICD-10-CM

## 2024-01-25 DIAGNOSIS — M62.81 PROXIMAL MUSCLE WEAKNESS: ICD-10-CM

## 2024-01-25 LAB
ANA SER QL HEP2 SUBST: NEGATIVE
ENA JO1 AB SER QL IA: <0.2 AI
ENA SS-A AB SER IA-ACNC: <0.2 AI
ENA SS-B AB SER IA-ACNC: <0.2 AI
SMOOTH MUSCLE AB SER QL IF: NEGATIVE

## 2024-01-25 PROCEDURE — 72141 MRI NECK SPINE W/O DYE: CPT | Performed by: RADIOLOGY

## 2024-01-25 PROCEDURE — 72146 MRI CHEST SPINE W/O DYE: CPT

## 2024-01-25 PROCEDURE — 72141 MRI NECK SPINE W/O DYE: CPT

## 2024-01-25 PROCEDURE — 72146 MRI CHEST SPINE W/O DYE: CPT | Performed by: RADIOLOGY

## 2024-01-25 NOTE — RESULT ENCOUNTER NOTE
Called patient 1/24/24 and discussed results at length. With help of Dr. Blake, patient has been scheduled for appointment with Dr. Galicia (Neurosurgery) and will be following up with him on Monday. Emphasized the importance of keeping this appointment with Dr. Galicia who has already begun reviewing patient's records.

## 2024-01-29 ENCOUNTER — HOSPITAL ENCOUNTER (OUTPATIENT)
Dept: RADIOLOGY | Facility: CLINIC | Age: 64
Discharge: HOME | End: 2024-01-29
Payer: COMMERCIAL

## 2024-01-29 ENCOUNTER — OFFICE VISIT (OUTPATIENT)
Dept: NEUROSURGERY | Facility: CLINIC | Age: 64
End: 2024-01-29
Payer: COMMERCIAL

## 2024-01-29 VITALS
BODY MASS INDEX: 18.01 KG/M2 | DIASTOLIC BLOOD PRESSURE: 70 MMHG | TEMPERATURE: 97.8 F | HEIGHT: 72 IN | WEIGHT: 133 LBS | HEART RATE: 98 BPM | SYSTOLIC BLOOD PRESSURE: 112 MMHG

## 2024-01-29 DIAGNOSIS — M62.81 PROXIMAL MUSCLE WEAKNESS: ICD-10-CM

## 2024-01-29 DIAGNOSIS — M51.36 BULGING OF LUMBAR INTERVERTEBRAL DISC: ICD-10-CM

## 2024-01-29 DIAGNOSIS — Z85.118 HISTORY OF LUNG CANCER: ICD-10-CM

## 2024-01-29 DIAGNOSIS — M62.89 DECREASED MUSCLE TONE: ICD-10-CM

## 2024-01-29 LAB
ANCA AB PATTERN SER IF-IMP: NORMAL
ANCA IGG TITR SER IF: NORMAL {TITER}
MYELOPEROXIDASE AB SER-ACNC: 0 AU/ML (ref 0–19)
PROTEINASE3 AB SER-ACNC: 0 AU/ML (ref 0–19)

## 2024-01-29 PROCEDURE — 71250 CT THORAX DX C-: CPT | Performed by: RADIOLOGY

## 2024-01-29 PROCEDURE — 99204 OFFICE O/P NEW MOD 45 MIN: CPT | Performed by: STUDENT IN AN ORGANIZED HEALTH CARE EDUCATION/TRAINING PROGRAM

## 2024-01-29 PROCEDURE — 71250 CT THORAX DX C-: CPT

## 2024-01-29 ASSESSMENT — LIFESTYLE VARIABLES
HOW OFTEN DO YOU HAVE SIX OR MORE DRINKS ON ONE OCCASION: NEVER
HOW OFTEN DO YOU HAVE A DRINK CONTAINING ALCOHOL: 2-4 TIMES A MONTH
AUDIT-C TOTAL SCORE: 2
SKIP TO QUESTIONS 9-10: 1
HOW MANY STANDARD DRINKS CONTAINING ALCOHOL DO YOU HAVE ON A TYPICAL DAY: 1 OR 2

## 2024-01-29 ASSESSMENT — PAIN SCALES - GENERAL: PAINLEVEL: 7

## 2024-01-29 ASSESSMENT — PATIENT HEALTH QUESTIONNAIRE - PHQ9
SUM OF ALL RESPONSES TO PHQ9 QUESTIONS 1 & 2: 0
2. FEELING DOWN, DEPRESSED OR HOPELESS: NOT AT ALL
1. LITTLE INTEREST OR PLEASURE IN DOING THINGS: NOT AT ALL

## 2024-01-29 NOTE — PROGRESS NOTES
[facility] [place]  Department of Neurological Surgery  New Patient Visit    History of Present Illness:  Marshall Silva is a 63 y.o. year old male who presents to the spine clinic with neck and mid/low back pain. He has myelopathy and balance coordination issues worsening over the past two years. He has global balance loss. He has had multiple falls. He has a history of lung cancer around 10 years ago on the right side. He had a lobectomy and did chemotherapy, but no radiation. Everything has been clear since and he has been undergoing thorough workup. He is c/o of weight loss and states that he has been losing weight ever since.    Prior Spine Surgeries: None    Physical Therapy: No  Diabetic: No   Osteoporosis: No  Patient's BMI is Body mass index is 18.04 kg/m².    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)    Cough    Flu-like symptoms    GERD (gastroesophageal reflux disease)    History of lung cancer    Lung cancer (CMS/HCC)    Nasal congestion    Nausea with vomiting    Polyp of colon    URI with cough and congestion    Urinary urgency    Rib contusion, right, sequela    Proximal muscle weakness    Decreased muscle tone    Bulging of lumbar intervertebral disc     Past Medical History:   Diagnosis Date    Personal history of other diseases of the respiratory system 01/08/2021    History of chronic obstructive lung disease     Past Surgical History:   Procedure Laterality Date    OTHER SURGICAL HISTORY  01/08/2021    Lung lobectomy     Social History     Tobacco Use    Smoking status: Every Day     Types: Cigarettes    Smokeless tobacco: Not on file   Substance Use Topics     Alcohol use: Yes     Alcohol/week: 2.0 standard drinks of alcohol     Types: 2 Cans of beer per week     family history includes Cancer in his father; Heart attack in an other family member.    Current Outpatient Medications:     fluticasone-umeclidin-vilanter (Trelegy Ellipta) 200-62.5-25 mcg blister with device, Inhale 1 puff once daily., Disp: , Rfl:     omeprazole (PriLOSEC) 20 mg DR capsule, Take 1 capsule (20 mg) by mouth once daily in the morning. Take before meals., Disp: , Rfl:   No Known Allergies    Physical Examination    General: Well developed, awake/alert/oriented x3, no distress, alert and cooperative  Skin: Warm and dry, no lesions, no rashes  ENMT: Mucous membranes moist, no apparent injury, no lesions seen  Head/Neck: Neck Supple, no apparent injury  Respiratory/Thorax: Normal breath sounds with good chest expansion, thorax symmetric  Cardiovascular: No pitting edema, no JVD    Motor Strength: 5/5 Throughout all extremities    Muscle Bulk: Normal and symmetric in all extremities    Posture:   -- Cervical: Normal  -- Thoracic: Normal  -- Lumbar : Normal  Paraspinal muscle spasm/tenderness absent.     Sensation: intact to light touch    Severe balance coordination loss  Ataxia  Some hyperreflexia at his knees    Results    I personally reviewed and interpreted the imaging results which included MRI of cervical spine showing severe spinal stenosis at C4-5 and moderate spinal stenosis at C6-7.    Assessment and Plan:    Marshall Silva is a 63 y.o. year old male who presents to the spine clinic with moderate cervical myelopathy. I would recommend that he considers surgery but he needs to [place] back with his employer to discuss appropriate time off to undergo the procedure. Given his spinal cord compression, I think he will need this addressed sooner than later.    I have reviewed imaging and diagnosis with the patient, discussed the natural history of their disease and both non-operative and  operative treatments available and rationale vs risks for both.    The patient's clinical symptoms correlates well with the radiological findings. He has been having significant functional impairment with decreased ability to perform her normal activities of daily living. They have tried treatment options including medications (NSAIDs/narcotics/muscle relaxants/membrane stabilizers), formal physical therapy, and injections.     I offered the option of surgery that would consist of a C4-7 laminoplasty.     While there is no evidence of instability preoperatively on imaging, a cervical decompression alone has a 20% chance of post-laminectomy kyphosis due to the instability created by removal of the posterior elements including the spinous process, interspinous ligament, and lamina. I counseled the patient that an instrumented fusion with autograft and allograft would be necessary for long-term pain relief and stability is needed.     I have explained the surgical procedure in detail with expected duration and extent of recovery along risks of surgery that include, but is not limited to bleeding, infection, blood vessel injury or damage, loss of sensation, loss of bladder, bowel or sexual function, nerve injury/damage resulting in weakness/paralysis, malunion, nonunion, CSF leak, brachial plexus injury, peripheral vision blindness, failure of implants/fusion, failure to relieve symptoms, recurrent disease, adjacent segment disease, need to reoperate for any reason and general anesthesia reaction such as stroke, coma, heart attack, delirium, confusion, death as well as worsening of preexisted medical conditions. I have also discussed with the patient the chances of C5 palsy.     I clearly emphasized that while the goal of surgery is to decompress the spinal cord so as to ARREST the progression of neurological deficits - preexisting deficits may or may not improve after surgery. We discussed that up to 90% of patients do  show clinically significant improvement in functional and neurological outcomes following decompression of the spinal cord in patients with cervical degenerative myelopathy or radiculopathy the extent of which is variable and depends on the severity of myelopathy, duration of deficits and age of the patient.    All questions were answered and the patient left satisfied with the surgical plan moving forward.     I have reviewed all prior documentation and reviewed the electronic medical record since admission. I have personally have reviewed all advanced imaging not just the reports and used my interpretation as documented as the relevant findings. I have reviewed the risks and benefits of all treatment recommendations listed in this note with the patient and family. I spent a total of 45 minutes in service to this patient's care during this date of service.    The above clinical summary has been dictated with voice recognition software. It has not been proofread for grammatical errors, typographical mistakes, or other semantic inconsistencies.    Thank you for visiting our office today. It was our pleasure to take part in your healthcare.     Do not hesitate to call with any questions regarding your plan of care after leaving at [phone] M-F 8am-4pm.     To clinicians, thank you very much for this kind referral. It is a privilege to partner with you in the care of your patients. My office would be delighted to assist you with any further consultations or with questions regarding the plan of care outlined. Do not hesitate to call the office or contact me directly.       Sincerely,      Jovon Galicia MD  Director, [facility] [place]   of Neurosurgery, [facility] and [facility]  Complex Spine Fellowship Director  , Department of Neurological Surgery  [facility] [employer]    [facility]  [facility]  [address]  [address]  [place] [address]    [facility]  [address]  [address]  [place] [address]    Phone: [phone]  Fax: [phone]        Scribe Attestation  By signing my name below, I, Zina Curry , Scribe   attest that this documentation has been prepared under the direction and in the presence of Jovon Galicia MD.

## 2024-01-30 ENCOUNTER — OFFICE VISIT (OUTPATIENT)
Dept: PRIMARY CARE | Facility: CLINIC | Age: 64
End: 2024-01-30
Payer: COMMERCIAL

## 2024-01-30 VITALS
WEIGHT: 132 LBS | SYSTOLIC BLOOD PRESSURE: 118 MMHG | BODY MASS INDEX: 17.9 KG/M2 | DIASTOLIC BLOOD PRESSURE: 76 MMHG | RESPIRATION RATE: 16 BRPM | TEMPERATURE: 97.9 F | OXYGEN SATURATION: 99 % | HEART RATE: 92 BPM

## 2024-01-30 DIAGNOSIS — C34.90 MALIGNANT NEOPLASM OF LUNG, UNSPECIFIED LATERALITY, UNSPECIFIED PART OF LUNG (MULTI): ICD-10-CM

## 2024-01-30 DIAGNOSIS — J43.9 PULMONARY EMPHYSEMA, UNSPECIFIED EMPHYSEMA TYPE (MULTI): ICD-10-CM

## 2024-01-30 DIAGNOSIS — F19.21 HISTORY OF SUBSTANCE DEPENDENCE (MULTI): ICD-10-CM

## 2024-01-30 DIAGNOSIS — G95.9 CERVICAL MYELOPATHY (MULTI): Primary | ICD-10-CM

## 2024-01-30 PROCEDURE — 99213 OFFICE O/P EST LOW 20 MIN: CPT

## 2024-01-30 ASSESSMENT — ENCOUNTER SYMPTOMS
DIZZINESS: 0
FEVER: 0
VOMITING: 0
BACK PAIN: 1
ARTHRALGIAS: 1
LIGHT-HEADEDNESS: 0
NAUSEA: 0
SHORTNESS OF BREATH: 0

## 2024-01-30 NOTE — PROGRESS NOTES
Subjective   HPI  Marshall Silva is a 63 y.o. male who is here for discussion of handicap placard. Last week seen for generalized balance and weakness in lower extremety gait instability and was diagnosed with cervical myelopathy.  He subsequently had an appointment with neurosurgeon Dr. Veto Galicia on 1/29/2024.  He comes in today to discuss need for handicap placard due to difficulty walking long distances because of his unsteady gait.    Review of Systems   Constitutional:  Negative for fever.   Respiratory:  Negative for shortness of breath.    Cardiovascular:  Negative for chest pain.   Gastrointestinal:  Negative for nausea and vomiting.   Musculoskeletal:  Positive for arthralgias, back pain and gait problem.   Neurological:  Negative for dizziness and light-headedness.   All other systems reviewed and are negative.      Objective   /76 (BP Location: Right arm, Patient Position: Sitting, BP Cuff Size: Adult)   Pulse 92   Temp 36.6 °C (97.9 °F)   Resp 16   Wt 59.9 kg (132 lb)   SpO2 99%   BMI 17.90 kg/m²     Physical Exam  Vitals reviewed.   Constitutional:       General: He is not in acute distress.     Appearance: Normal appearance. He is not toxic-appearing.   HENT:      Head: Normocephalic and atraumatic.      Nose: Nose normal.   Eyes:      Extraocular Movements: Extraocular movements intact.   Cardiovascular:      Rate and Rhythm: Normal rate and regular rhythm.      Heart sounds: No murmur heard.     No friction rub. No gallop.   Pulmonary:      Effort: Pulmonary effort is normal. No respiratory distress.      Breath sounds: Normal breath sounds. No wheezing, rhonchi or rales.   Skin:     General: Skin is warm and dry.   Neurological:      Mental Status: He is alert.      Motor: Weakness (as previously described. no new changes) present.   Psychiatric:         Mood and Affect: Mood normal.         Behavior: Behavior normal.         Assessment/Plan patient here for discussion about need for  handicap placard due to his unstable gait and lower extremity weakness.  He was recently diagnosed with cervical myelopathy and had an appointment with neurosurgery who agrees that this is consistent with his physical manifestations.  Order for handicap placard was provided and given to patient with instructions to go to the [facility] to complete the process.  Patient was also given MRI reports from his recent imaging and these were reviewed with him at length last week and done so again at his neurosurgery appointment on 1/29/2024.  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

## 2024-01-31 NOTE — PROGRESS NOTES
Subjective   Patient ID: Marshall Silva is a 63 y.o. male who presents for No chief complaint on file..  HPI  Mr. Silva is a 63M NSCLC, GERD, COPD, tobacco use disorder who is referred to pulmonary for history of lung cancer by Dr. Blake.     Recent visits with PCP clinic for LE weakness and LUTS.   CT chest seems to have been done for lung cancer screening.  Previously seen by Dr. Kennedy who noted stage 2 NSCLC in 2018 with RUL lobectomy only. Last visit 6/27/2022 mostly for COPD with intention to follow up in 2 months.     Family History:    Social History:    Review of Systems    Objective   Physical Exam    No PFTs available    CT chest 1/29/2024  FINDINGS:  Lungs and Pleura: Redemonstrated findings of right sided partial pneumonectomy suggestive of right upper lobectomy. Similar appearance of right apical pleural thickening. Redemonstrated chronic consolidation versus mass in the right lung apex measuring 3.1 x 1.9 cm, previously measuring 2.9 x 1.5 cm. Mild emphysematous changes in the right lung are again noted. Redemonstrated tree-in-bud opacities throughout the right lower lobe, which are overall similar in appearance. Mild biapical paraseptal pulmonary emphysematous changes are also present.      Mediastinum and axilla: No adenopathy by CT size criteria. No cardiomegaly or pericardial effusion. No thoracic aortic aneurysm.  Mild atherosclerosis.      Visualized Upper Abdomen: No worrisome findings in the visualized upper abdomen.      MSK/Chest Wall: No aggressive bony lesion identified. Chronic appearing deformity of the right distal clavicle.          IMPRESSION:  Redemonstrated postoperative changes of partial right pneumonectomy with associated volume loss in the right lung and rightward mediastinal shift. Overall, there is similar appearance of right apical pleural thickening, with the exception of an area of chronic consolidation versus mass in the right lung apex which has slightly increased in  size. Stable soft tissue fullness in the medial aspect of the right upper lobe along the mediastinal margin where the surgical clips are.      Redemonstrated tree-in-bud opacities in the right lower lobe suggesting atypical infection.   Mild pulmonary emphysematous changes are similar in appearance.    Assessment/Plan   {Assess/PlanSmartLinks:06642}         Maricel Vega MD 01/31/24 12:19 AM

## 2024-01-31 NOTE — PROGRESS NOTES
I reviewed with the resident the medical history and the resident’s findings on physical examination.  I discussed with the resident the patient’s diagnosis and concur with the treatment plan as documented in the resident note.     Asa Paulson, DO

## 2024-02-01 ENCOUNTER — APPOINTMENT (OUTPATIENT)
Dept: PULMONOLOGY | Facility: CLINIC | Age: 64
End: 2024-02-01
Payer: COMMERCIAL

## 2024-02-02 NOTE — PROGRESS NOTES
Subjective   Patient ID: Marshall Silva is a 63 y.o. male who presents for No chief complaint on file..  HPI  Mr. Silva is a 63M NSCLC, GERD, COPD, tobacco use disorder who is referred to pulmonary for history of lung cancer by Dr. Blake.     Recent visits with PCP clinic for LE weakness and LUTS.   CT chest seems to have been done for lung cancer screening.  Previously seen by Dr. Kennedy who noted stage 2 NSCLC in 2018 with RUL lobectomy only. Last visit 6/27/2022 mostly for COPD with intention to follow up in 2 months.     #NSCLC  #Abnormal lung imaging  #Dyspnea on exertion  He follows with his PCP for annual CT and then was sent to this visit. He notices SOB on exertion since his cancer treatment.   He had a RUL lobectomy in 2018 in [place]. 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 His job has him go to jobs across the country.   When he presented with NSCLC he had chest pain and sharp pain. He had arm numbness and then imaging showed malignancy. He cannot recall the type of cancer. He was treated in [facility] with resection and chemotherapy. He does not recall the chemotherapy.   He last saw his oncologist 6 years ago.   mMRC 1  He has chronic cough that is unchanged. His cough is worse in the AM  He thinks the trelegy helps.   He has generalized weakness attributed to his spinal disease.   He has been eating more but not gaining weight.   He is uptodate on covid shot. He does not get flu shots.   He has not had any exacerbations in the last year.     #Tobacco use disorder  He smokes just under a pack a day. Started age 18. He used to smoke 4 ppd.   He started cutting back 2013, due to work restrictions.   He has quit for about 1 year total before.     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 Negative for chills, fever and unexpected weight change.        Night sweat for past 4 years, weight roughly stable since cancer diagnosis but hasn't regained   HENT:  Positive for postnasal drip and rhinorrhea. Negative for congestion, sore throat and voice change.    Respiratory:  Positive for cough, shortness of breath and wheezing. Negative for chest tightness.         No hemoptysis   Cardiovascular:  Positive for palpitations and leg swelling. Negative for chest pain.   Gastrointestinal:  Positive for abdominal pain.   Musculoskeletal:  Positive for back pain.   Skin:  Negative for rash.   Allergic/Immunologic: Negative for environmental allergies.   Neurological:  Positive for weakness and numbness.        4th and 5th digit numbness, Difficulty walking and weakness attributed to spinal disease   Hematological:  Negative for adenopathy.       Objective   Physical Exam  Vitals reviewed.   Constitutional:       Appearance: Normal appearance.   HENT:      Head: Normocephalic and atraumatic.      Nose: Nose normal. No rhinorrhea.      Mouth/Throat:      Mouth: Mucous membranes are dry.      Pharynx: No posterior oropharyngeal erythema.   Eyes:      Extraocular Movements: Extraocular movements intact.   Cardiovascular:      Rate and Rhythm: Normal rate and regular rhythm.      Heart sounds: No murmur heard.  Pulmonary:      Breath sounds: No wheezing or rhonchi.   Abdominal:      General: Abdomen is flat.   Musculoskeletal:         General: Normal range of motion.      Cervical back: Neck supple. No tenderness.      Right lower leg: No edema.      Left lower leg: No edema.   Lymphadenopathy:      Cervical: No cervical adenopathy.   Skin:     General: Skin is warm and dry.   Neurological:      General: No focal deficit present.      Mental Status: He is alert and oriented to person,  place, and time.      Motor: Weakness present.      Gait: Gait normal.         No PFTs available    CT chest 1/29/2024  FINDINGS:  Lungs and Pleura: Redemonstrated findings of right sided partial pneumonectomy suggestive of right upper lobectomy. Similar appearance of right apical pleural thickening. Redemonstrated chronic consolidation versus mass in the right lung apex measuring 3.1 x 1.9 cm, previously measuring 2.9 x 1.5 cm. Mild emphysematous changes in the right lung are again noted. Redemonstrated tree-in-bud opacities throughout the right lower lobe, which are overall similar in appearance. Mild biapical paraseptal pulmonary emphysematous changes are also present.      Mediastinum and axilla: No adenopathy by CT size criteria. No cardiomegaly or pericardial effusion. No thoracic aortic aneurysm.  Mild atherosclerosis.      Visualized Upper Abdomen: No worrisome findings in the visualized upper abdomen.      MSK/Chest Wall: No aggressive bony lesion identified. Chronic appearing deformity of the right distal clavicle.          IMPRESSION:  Redemonstrated postoperative changes of partial right pneumonectomy with associated volume loss in the right lung and rightward mediastinal shift. Overall, there is similar appearance of right apical pleural thickening, with the exception of an area of chronic consolidation versus mass in the right lung apex which has slightly increased in size. Stable soft tissue fullness in the medial aspect of the right upper lobe along the mediastinal margin where the surgical clips are.      Redemonstrated tree-in-bud opacities in the right lower lobe suggesting atypical infection.   Mild pulmonary emphysematous changes are similar in appearance.    Assessment/Plan   Diagnoses and all orders for this visit:  Abnormal finding on lung imaging  -     Respiratory Culture/Smear  -     AFB Culture/Smear  -     NM PET CT FDG wholebody; Future  -     Miscellaneous DME  -     Complete Pulmonary  Function Test Pre/Post Bronchodialator (Spirometry Pre/Post/DLCO/Lung Volumes); Future  -     Pulmonary Stress Test (6 Min. Walk); Future  -     albuterol (Ventolin HFA) 90 mcg/actuation inhaler; Inhale 2 puffs every 4 hours if needed for wheezing or shortness of breath.  History of lung cancer  -     Referral to Pulmonology  -     Respiratory Culture/Smear  -     AFB Culture/Smear  -     NM PET CT FDG wholebody; Future  -     Miscellaneous DME  -     Complete Pulmonary Function Test Pre/Post Bronchodialator (Spirometry Pre/Post/DLCO/Lung Volumes); Future  -     Pulmonary Stress Test (6 Min. Walk); Future  -     albuterol (Ventolin HFA) 90 mcg/actuation inhaler; Inhale 2 puffs every 4 hours if needed for wheezing or shortness of breath.    Mr. Silva is a 63M NSCLC, GERD, COPD?, tobacco use disorder who is referred to pulmonary for history of lung cancer by Dr. Blake.     #NSCLC  #Abnormal lung imaging  Patient with RUL NSCLC from past notes but at OSH. Pleural thickening noted on imaging here but unable to confirm with [place] records is this was immediately noted post-operatively. He has not been following with oncology for surveillance since staying in [place] for part of the year.   He has weakness which can be from his spinal stenosis but his night sweats and poor weight gain are concerning for malignancy or atypical infection.   PET ordered at this time.   Patient has productive cough and sputum sample ordered with airway clearance with VPEP device.   PFTs never performed in this health system that I can see so will order at this time.     #Tobacco use disorder  Patient not currently interested in quitting. Will  more at return visit.     RTC 4-6 weeks after testing    Maricel Vega MD 02/02/24 2:28 PM

## 2024-02-04 LAB — CRYOGLOB SER QL: NORMAL

## 2024-02-09 ENCOUNTER — OFFICE VISIT (OUTPATIENT)
Dept: PULMONOLOGY | Facility: CLINIC | Age: 64
End: 2024-02-09
Payer: COMMERCIAL

## 2024-02-09 VITALS
SYSTOLIC BLOOD PRESSURE: 119 MMHG | TEMPERATURE: 98.6 F | DIASTOLIC BLOOD PRESSURE: 70 MMHG | BODY MASS INDEX: 18.53 KG/M2 | WEIGHT: 136.8 LBS | HEIGHT: 72 IN | OXYGEN SATURATION: 91 % | RESPIRATION RATE: 16 BRPM | HEART RATE: 79 BPM

## 2024-02-09 DIAGNOSIS — Z85.118 HISTORY OF LUNG CANCER: ICD-10-CM

## 2024-02-09 DIAGNOSIS — R91.8 ABNORMAL FINDING ON LUNG IMAGING: Primary | ICD-10-CM

## 2024-02-09 PROCEDURE — 99204 OFFICE O/P NEW MOD 45 MIN: CPT | Performed by: INTERNAL MEDICINE

## 2024-02-09 RX ORDER — ALBUTEROL SULFATE 90 UG/1
2 AEROSOL, METERED RESPIRATORY (INHALATION) EVERY 4 HOURS PRN
Qty: 18 G | Refills: 5 | Status: SHIPPED | OUTPATIENT
Start: 2024-02-09 | End: 2025-02-08

## 2024-02-09 ASSESSMENT — ASTHMA QUESTIONNAIRES
ACT_TOTALSCORE: 12
QUESTION_1 LAST FOUR WEEKS HOW MUCH OF THE TIME DID YOUR ASTHMA KEEP YOU FROM GETTING AS MUCH DONE AT WORK, SCHOOL OR AT HOME: NONE OF THE TIME
QUESTION_4 LAST FOUR WEEKS HOW OFTEN HAVE YOU USED YOUR RESCUE INHALER OR NEBULIZER MEDICATION (SUCH AS ALBUTEROL): 1 OR TWO TIMES PER DAY
QUESTION_5 LAST FOUR WEEKS HOW WOULD YOU RATE YOUR ASTHMA CONTROL: SOMEWHAT CONTROLLED
QUESTION_2 LAST FOUR WEEKS HOW OFTEN HAVE YOU HAD SHORTNESS OF BREATH: MORE THAN ONCE A DAY

## 2024-02-09 ASSESSMENT — ENCOUNTER SYMPTOMS
DIAPHORESIS: 1
OCCASIONAL FEELINGS OF UNSTEADINESS: 1
CHEST TIGHTNESS: 0
SHORTNESS OF BREATH: 1
VOICE CHANGE: 0
ABDOMINAL PAIN: 1
WHEEZING: 1
FEVER: 0
CHILLS: 0
ADENOPATHY: 0
RHINORRHEA: 1
PALPITATIONS: 1
UNEXPECTED WEIGHT CHANGE: 0
DEPRESSION: 0
COUGH: 1
NUMBNESS: 1
SORE THROAT: 0
BACK PAIN: 1
LOSS OF SENSATION IN FEET: 0
WEAKNESS: 1

## 2024-02-09 ASSESSMENT — PATIENT HEALTH QUESTIONNAIRE - PHQ9
1. LITTLE INTEREST OR PLEASURE IN DOING THINGS: NOT AT ALL
2. FEELING DOWN, DEPRESSED OR HOPELESS: NOT AT ALL
SUM OF ALL RESPONSES TO PHQ9 QUESTIONS 1 AND 2: 0

## 2024-02-09 ASSESSMENT — COLUMBIA-SUICIDE SEVERITY RATING SCALE - C-SSRS
1. IN THE PAST MONTH, HAVE YOU WISHED YOU WERE DEAD OR WISHED YOU COULD GO TO SLEEP AND NOT WAKE UP?: NO
6. HAVE YOU EVER DONE ANYTHING, STARTED TO DO ANYTHING, OR PREPARED TO DO ANYTHING TO END YOUR LIFE?: NO
2. HAVE YOU ACTUALLY HAD ANY THOUGHTS OF KILLING YOURSELF?: NO

## 2024-02-09 ASSESSMENT — PAIN SCALES - GENERAL: PAINLEVEL: 8

## 2024-02-09 NOTE — PATIENT INSTRUCTIONS
Thank you for coming in today! Please call with questions or concerns.    Your CAT scan shows slightly bigger spots in the right upper lobe. This could be infection vs cancer.   Please give a sputum sample, make sure its a deep cough.  We should check for cancer growth with a PET scan.     Please schedule:  - PET scan - this will look for any active growth in you Right upper lung were the spot is and where the cancer used to be. It is slightly worse than before but it is unclear to me what the cause it.   - Pulmonary function tests - these help establish your lung function and diagnose COPD.     Please start:  - albuterol inhaler - you can use this as needed for symptoms    Return to clinic in 4-6 weeks after PET

## 2024-02-13 ENCOUNTER — TELEPHONE (OUTPATIENT)
Dept: PRIMARY CARE | Facility: CLINIC | Age: 64
End: 2024-02-13

## 2024-02-13 ENCOUNTER — APPOINTMENT (OUTPATIENT)
Dept: LAB | Facility: LAB | Age: 64
End: 2024-02-13
Payer: COMMERCIAL

## 2024-02-13 ENCOUNTER — TELEPHONE (OUTPATIENT)
Dept: CRITICAL CARE MEDICINE | Facility: HOSPITAL | Age: 64
End: 2024-02-13

## 2024-02-13 DIAGNOSIS — R91.8 LUNG MASS: Primary | ICD-10-CM

## 2024-02-13 DIAGNOSIS — C34.91 NSCLC OF RIGHT LUNG (MULTI): ICD-10-CM

## 2024-02-13 LAB
BACTERIA SPEC RESP CULT: ABNORMAL
GRAM STN SPEC: ABNORMAL
GRAM STN SPEC: ABNORMAL

## 2024-02-13 PROCEDURE — 87116 MYCOBACTERIA CULTURE: CPT

## 2024-02-13 PROCEDURE — 87015 SPECIMEN INFECT AGNT CONCNTJ: CPT

## 2024-02-13 PROCEDURE — 87206 SMEAR FLUORESCENT/ACID STAI: CPT

## 2024-02-13 PROCEDURE — 87205 SMEAR GRAM STAIN: CPT

## 2024-02-13 NOTE — TELEPHONE ENCOUNTER
Recommend reaching out to Dr. Vega office to discuss PET scan scheduling. Provided phone number and instructions to call back our office tomorrow if issue is not resolved.

## 2024-02-14 ENCOUNTER — TELEPHONE (OUTPATIENT)
Dept: PULMONOLOGY | Facility: CLINIC | Age: 64
End: 2024-02-14
Payer: COMMERCIAL

## 2024-02-14 NOTE — TELEPHONE ENCOUNTER
Patient called office requesting PET to have status change to get before out of town. I can place that order but it might not make a difference.

## 2024-02-14 NOTE — TELEPHONE ENCOUNTER
CHIEF COMPLAINT:  Chief Complaint   Patient presents with   • Derm Problem   • Alcohol Problem     reported has been drinking heavily again for 30 days and has been trying to decrease slowly. Reported had a vomiting and questionable virus 4 days ago, did start to eat again and have no abd pain. Reported last drink was 0200 on 12/25/22. Reported noted yesterday eye were yellow on 12/25/22. Denies abd pain, concerned about skin itching, eye jaundice.     HPI  Mai Frey is a 35 year old female who presents secondary to jaundiced.  The patient does have a known history of alcohol abuse.  She has no history of ascites.  She has never had a paracentesis.  She has never been jaundiced.  She describes that earlier this week she had some symptoms that she is so she eats of pancreatitis.  However those have since resolved.  She has not been vomiting.  However she did have viral syndrome about 4 days ago as well.  She does complain of diffuse skin itching.  No focal rashes.  She reports that she has been drinking heavily for past 30 days and has been trying to decrease slowly.  Her last drink was about 2:00 a.m. on 12/25.    All ROS negative except as above in the HPI    ALLERGIES:  ALLERGIES:  No Known Allergies    CURRENT MEDICATIONS:  No current facility-administered medications for this encounter.     Current Outpatient Medications   Medication Sig   • hydrOXYzine (ATARAX) 10 MG tablet Take 1 tablet by mouth every 8 hours as needed for Itching.       PAST MEDICAL HISTORY:  Past Medical History:   Diagnosis Date   • Anxiety     was on Xanax and hydroxyzine and Lexapro   • Delivery normal 12/29/2021   • Depression     on Lexapro in the past   • Heavy alcohol use     Sober since december 2020   • Herpes simplex virus infection    • Pancreatitis, alcoholic, acute 12/2020   • Postpartum depression    • PTSD (post-traumatic stress disorder)        SURGICAL HISTORY:  Past Surgical History:   Procedure Laterality Date   •  Patient called in to have his PET scan order turned into a stat to get it done before [place] leaves the     [place]. Dr. Vega change it and Patient was called.   [place] tooth extraction  2016       SOCIAL HISTORY:  Social History     Tobacco Use   • Smoking status: Former     Packs/day: 0.10     Years: 1.00     Pack years: 0.10     Types: Cigarettes     Start date: 2006     Quit date: 2012     Years since quitting: 10.9   • Smokeless tobacco: Never   • Tobacco comments:     smoked on an off for years   Substance Use Topics   • Alcohol use: Not Currently     Comment: daily- vodka   • Drug use: Yes     Frequency: 5.0 times per week     Types: Marijuana     Comment: trying to quit       FAMILY HISTORY:  Family History   Problem Relation Age of Onset   • Diabetes Mother    • Liver Disease Mother    • Anxiety disorder Father    • Cancer, Prostate Father 18   • Substance Abuse Brother    • Schizophrenia Maternal Grandmother    • Substance Abuse Maternal Grandfather         heroin, cocaine, meth   • Myocardial Infarction Maternal Grandfather    • Alcohol Abuse Maternal Grandfather    • Anxiety disorder Paternal Grandmother    • Depression Paternal Grandmother    • Alcohol Abuse Paternal Grandfather    • Liver Disease Paternal Grandfather    • Anxiety disorder Brother    • Schizophrenia Brother    • Depression Brother    • Psychiatric Brother         PTSD   • Substance Abuse Brother         sober since 2015   • Patient is unaware of any medical problems Son    • Patient is unaware of any medical problems Son    • Patient is unaware of any medical problems Daughter        REVIEW OF SYSTEMS:  All ROS negative except as above in the HPI    PHYSICAL EXAM:  ED Triage Vitals [12/26/22 1306]   ED Triage Vitals Group      Temp 98.2 °F (36.8 °C)      Heart Rate (!) 102      Resp 18      BP (!) 157/107      SpO2 100 %      EtCO2 mmHg       Height 5' (1.524 m)      Weight 111 lb 3.2 oz (50.4 kg)      Weight Scale Used Chair scale      BMI (Calculated) 21.72      IBW/kg (Calculated) 45.5     Vitals reviewed per nursing notes.  General:  Patient is in no acute distress, non toxic appearing,  alert, pleasant & appropriate.  Head: Atraumatic, normal cephalic.  Eyes: No ptosis, edema or lesions of the lid.  Positive scleral icterus.  Mouth:   Normal voice. No obvious obstructions or apparent discomfort.  Neck: Supple, no obvious masses or limitations of movement.  Lungs:  No respiratory distress. No labored breathing.  Heart: Tachycardic, no murmur.  Abdomen:  No apparent abdominal discomfort. No peritoneal findings.  Skin: Without rash or petechiae. No sign of trauma.   Extremities:  Without clubbing, cyanosis, or edema.  Musculoskeletal:  Activity does not suggest any skeletal pain or limitations of motion.   Neuro:  Moves all extremities spontaneously. Able to understand and respond normally to questions.  Psych:  Mood and affect are normal.  Judgement is normal.    Abnormal Lab Results  Labs Reviewed   COMPREHENSIVE METABOLIC PANEL - Abnormal; Notable for the following components:       Result Value    Potassium 3.1 (*)     BUN 5 (*)     Creatinine 0.37 (*)     Bilirubin, Total 2.9 (*)     GOT/ (*)     GPT/ (*)     All other components within normal limits   NT PROBNP - Abnormal; Notable for the following components:    NT-proBNP 255 (*)     All other components within normal limits   MAGNESIUM - Abnormal; Notable for the following components:    Magnesium 1.5 (*)     All other components within normal limits   PROCALCITONIN - Abnormal; Notable for the following components:    Procalcitonin 0.26 (*)     All other components within normal limits   URINALYSIS & REFLEX MICROSCOPY WITH CULTURE IF INDICATED - Abnormal; Notable for the following components:    PH, URINALYSIS 8.0 (*)     UROBILINOGEN, URINALYSIS 4.0 (*)     All other components within normal limits   DRUG SCREEN, URINE - Abnormal; Notable for the following components:    Cannabinoids, Urine Positive (*)     All other components within normal limits    Narrative:     Drug screening is for medical purposes only.  This is a  screening assay only and false-positive or false-negative results can occur. A definitive confirmation by LC/MS may be ordered to confirm clinically questionable results.   BILIRUBIN, DIRECT - Abnormal; Notable for the following components:    Bilirubin, Direct 2.1 (*)     All other components within normal limits   PROTHROMBIN TIME - Abnormal; Notable for the following components:    Prothrombin Time 13.8 (*)     All other components within normal limits   CBC WITH AUTOMATED DIFFERENTIAL (PERFORMABLE ONLY) - Abnormal; Notable for the following components:    HGB 10.6 (*)     HCT 32.9 (*)     RDW-CV 19.1 (*)     RDW-SD 56.4 (*)      (*)     Absolute Lymphocytes 0.9 (*)     All other components within normal limits    Narrative:     This is an appended report.  These results have been appended to a previously verified report.   TROPONIN I, HIGH SENSITIVITY - Normal   LACTIC ACID VENOUS WITH REFLEX - Normal   LIPASE - Normal   AMMONIA - Normal   PARTIAL THROMBOPLASTIN TIME - Normal    Narrative:     PTT  Therapeutic Range:  45-65 seconds.       CBC WITH DIFFERENTIAL    Narrative:     The following orders were created for panel order CBC with Automated Differential.  Procedure                               Abnormality         Status                     ---------                               -----------         ------                     CBC with Automated Dif...[[id number]]  Abnormal            Final result                 Please view results for these tests on the individual orders.   ALCOHOL   RAINBOW DRAW    Narrative:     The following orders were created for panel order Snowflake Draw Light Blue Top Collected? 1 Label; Red Top Collected? No Labels; Light Green Top Collected? 1 Label; Lavender Top Collected? 1 Label; Gold Top Collected? 1 Label; Pink Top Collected? No Labels; Grey Top Collected? 1 Label.  Procedure                               Abnormality         Status                     ---------                                -----------         ------                     Light Blue Top[[id number]]                                 In process                 Light Green Top[[id number]]                                In process                 Lavender Top[[id number]]                                   In process                 Gold Top[[id number]]                                       In process                 Coy Top Tube[[id number]]                                  In process                   Please view results for these tests on the individual orders.   LIGHT BLUE TOP   LIGHT GREEN TOP   LAVENDER TOP   GOLD TOP   GREY TOP TUBE   POCT CREATININE       Radiology  Imaging Results          CT ABDOMEN PELVIS W CONTRAST w/ IV contrast only (Final result)  Result time 12/26/22 16:35:31    Final result                 Impression:    IMPRESSION:     1. New or worsened severe diffuse hepatic steatosis. Correlate  with LFTs.  2. Mild nonspecific haziness of the mesentery and omentum may  reflect nonspecific inflammatory change.    Signed by: Patrick Mena             Narrative:    INTERPRETATION LOCATION:  [facility]     PROCEDURE: CT ABDOMEN PELVIS W CONTRAST        DATE:  12/26/2022 15:59      COMPARISONS: 10/15/2022    CLINICAL INDICATION/ORDERING DIAGNOSES:  Abdominal pain, acute,  nonlocalized. Dermatologic problem; alcohol problem           TECHNIQUE:  Axial images were obtained.  Coronal and sagittal  reformations were created.    Per PQRS, CT exam was performed  using 1 or more of the following dose reduction techniques:  Automated exposure control, adjustment of mA and/or KV according  to patient size, or use of interative reconstruction techniques.    CONTRAST: 100 cc Omnipaque 350 intravenous    FINDINGS:         Abdomen:    Lower thorax: Cardiac size is normal. There is no pericardial  or pleural effusion. Limited views of the lung bases are clear.      Liver: Severe diffuse hepatic  steatosis, new or worse from  previous CT performed on 10/15/2022. No intrahepatic biliary  ductal dilatation or focal liver lesion.      Gallbladder: No radiopaque gallstones. No extrahepatic biliary  ductal dilatation.      Spleen: Unremarkable.      Pancreas: Unremarkable.      Kidneys: No hydronephrosis. No suspicious renal lesions.      Adrenal glands: Unremarkable.      Peritoneum/fluid/lymphatics/vasculature: No free fluid or  pathologically enlarged upper abdominal lymph nodes.      Upper GI (esophagus, stomach, proximal small bowel):  Unremarkable.      Abdominal wall and osseous structures: No suspicious osseous  lesions.    Pelvis:    Distal small bowel and colon: Normal appendix. No bowel  obstruction. No abnormally thickened loops of large or small  bowel.      Reproductive structures/urinary bladder: Unremarkable.      Peritoneum/fluid/lymphatics/vasculature: Mild nonspecific  haziness of the mesentery and omentum. No free fluid. No  abnormally enlarged lymph nodes.      Pelvic wall and osseous structures: Large diffuse disc bulge at  L2-3 and L3-4 with superimposed caudally directed disc extrusion  at these levels. There is at least moderate spinal canal stenosis  and mild to moderate lateral foraminal stenosis at L2-3.  Correlate with any radiculopathy symptoms.                                 US Liver / Gallbladder / Pancreas (Final result)  Result time 12/26/22 15:19:48    Final result                 Impression:    IMPRESSION:      Hepatic steatosis. Borderline size of the common duct, increased  from the prior ultrasound. No distal obstructing stone or mass is  identified although the distal duct is obscured by bowel gas. If  further imaging evaluation of the biliary system is clinically  desired, consider MRCP.    Signed by: Benedict Pearce             Narrative:    INTERPRETATION LOCATION: [facility]    PROCEDURE: US LIVER / GALLBLADDER / PANCREAS    DATE:  12/26/2022 2:57 PM    COMPARISONS: Ultrasound  dated 1/21/2022 and CT dated 10/15/2022    CLINICAL INDICATION: 35 years Female     painless jaundice heavy alcohol use    TECHNIQUE:  Grayscale and color Doppler technique were utilized  to evaluate the right upper quadrant.    FINDINGS:    LIVER:  The liver exhibits increased echogenicity suggesting  diffuse fatty liver infiltration. No focal hepatic masses are  identified.    SABINO HEPATIS: The common bile duct measures 5.3 mm in diameter.  This is borderline in size for the patient's age. No distal  obstructing stone or mass is identified although the distal  common duct is obscured by bowel gas. There is no evidence of  intrahepatic biliary dilatation. The portal vein is patent with  appropriate hepatopetal flow direction.    GALLBLADDER:  No stones are identified within the gallbladder.  Gallbladder wall thickness is within normal limits. There is no  pericholecystic fluid.  Sonographic Llamas sign is absent.    PANCREAS: Obscured by bowel gas.    RIGHT KIDNEY: The right kidney measures 10.9 cm.  There is no  hydronephrosis. No focal renal masses are identified.                                  Last Vital Signs  Vitals:    12/26/22 1306 12/26/22 1533   BP: (!) 157/107 (!) 150/80   BP Location: LUE - Left upper extremity    Patient Position: Sitting/High-Alford's    Pulse: (!) 102 100   Resp: 18 18   Temp: 98.2 °F (36.8 °C)    TempSrc: Oral    SpO2: 100% 99%   Weight: 50.4 kg (111 lb 3.2 oz)    Height: 5' (1.524 m)    LMP: 12/01/2022       Treatment in ED:  ED Medication Orders (From admission, onward)    Ordered Start     Status Ordering Provider    12/26/22 1524 12/26/22 1525  LORazepam (ATIVAN) injection 1 mg  ONCE         Last MAR action: Given JOANNE MYERS    12/26/22 1524 12/26/22 1525  sodium chloride (NORMAL SALINE) 0.9 % bolus 1,000 mL  ONCE         Last MAR action: Completed JOANNE MYERS          ED Course as of 12/26/22 1735   Mon Dec 26, 2022   1711 My interpretation the EKG: Sinus  rhythm, rate 82, normal axis, normal intervals, no ST or T-wave changes consistent with ischemia. [MH]      ED Course User Index  [MH] Cliff Lopez DO       We did attempt to contact Gastroenterology.  We had waited response.  Ultimately, the patient did not wish to wait any longer.  This is reasonable as I cannot give her time for a other expected response.    Regardless, patient is stable and appropriate.  She does not wish to pursue alcohol counseling or a conversation with adapt at this time.  She states she feels as though she can not drink.  She has support of her family.  She is advised follow-up with Gastroenterology.    Diagnosis:  1. Jaundice  SERVICE TO GASTROENTEROLOGY          Follow Up:  No follow-up provider specified.   Please understand this is a provisional diagnosis that can and may change. The diagnosis that you are discharged with is based on the symptoms you described and the diagnostic information available today. If any new symptoms occur or worsen, you should seek immediate re-evaluation at the nearest ED. If any symptoms persist, please follow up for reassessment.    Treatment:     Summary of your Discharge Medications      You have not been prescribed any medications.          Cliff Lopez DO  12/26/22 9987

## 2024-02-15 ENCOUNTER — HOSPITAL ENCOUNTER (OUTPATIENT)
Dept: RADIOLOGY | Facility: HOSPITAL | Age: 64
Discharge: HOME | End: 2024-02-15
Payer: COMMERCIAL

## 2024-02-15 DIAGNOSIS — R91.8 LUNG MASS: ICD-10-CM

## 2024-02-15 DIAGNOSIS — C34.91 NSCLC OF RIGHT LUNG (MULTI): ICD-10-CM

## 2024-02-15 LAB — GLUCOSE BLD MANUAL STRIP-MCNC: 98 MG/DL (ref 74–99)

## 2024-02-15 PROCEDURE — 3430000001 HC RX 343 DIAGNOSTIC RADIOPHARMACEUTICALS

## 2024-02-15 PROCEDURE — 82947 ASSAY GLUCOSE BLOOD QUANT: CPT

## 2024-02-15 PROCEDURE — A9552 F18 FDG: HCPCS

## 2024-02-15 PROCEDURE — 78816 PET IMAGE W/CT FULL BODY: CPT | Mod: PI

## 2024-02-15 RX ORDER — FLUDEOXYGLUCOSE F 18 200 MCI/ML
9.5 INJECTION, SOLUTION INTRAVENOUS
Status: COMPLETED | OUTPATIENT
Start: 2024-02-15 | End: 2024-02-15

## 2024-02-15 RX ADMIN — FLUDEOXYGLUCOSE F 18 9.5 MILLICURIE: 200 INJECTION, SOLUTION INTRAVENOUS at 08:25

## 2024-02-16 NOTE — RESULT ENCOUNTER NOTE
Discussed with patient that the results of his PET scan had returned and that he should expect a call from his Pulmonologist in the near future about next steps. Recommended reaching out to the pulmonology office about these results.

## 2024-02-22 ENCOUNTER — TELEPHONE (OUTPATIENT)
Dept: PULMONOLOGY | Facility: CLINIC | Age: 64
End: 2024-02-22
Payer: COMMERCIAL

## 2024-02-22 NOTE — TELEPHONE ENCOUNTER
Called and left VM. PET showed minimal avidity. Unlikely to be progression of cancer.   Will repeat imaging in future to follow for further changes

## 2024-02-29 ENCOUNTER — HOSPITAL ENCOUNTER (OUTPATIENT)
Dept: RESPIRATORY THERAPY | Facility: HOSPITAL | Age: 64
Discharge: HOME | End: 2024-02-29
Payer: COMMERCIAL

## 2024-02-29 DIAGNOSIS — R91.8 ABNORMAL FINDING ON LUNG IMAGING: ICD-10-CM

## 2024-02-29 DIAGNOSIS — Z85.118 HISTORY OF LUNG CANCER: ICD-10-CM

## 2024-02-29 PROCEDURE — 94729 DIFFUSING CAPACITY: CPT | Performed by: INTERNAL MEDICINE

## 2024-02-29 PROCEDURE — 94060 EVALUATION OF WHEEZING: CPT | Performed by: INTERNAL MEDICINE

## 2024-02-29 PROCEDURE — 94726 PLETHYSMOGRAPHY LUNG VOLUMES: CPT

## 2024-02-29 PROCEDURE — 94618 PULMONARY STRESS TESTING: CPT | Performed by: INTERNAL MEDICINE

## 2024-02-29 PROCEDURE — 94726 PLETHYSMOGRAPHY LUNG VOLUMES: CPT | Performed by: INTERNAL MEDICINE

## 2024-03-01 LAB
MGC ASCENT PFT - FEV1 - POST: 1.76
MGC ASCENT PFT - FEV1 - PRE: 1.72
MGC ASCENT PFT - FEV1 - PREDICTED: 3.47
MGC ASCENT PFT - FVC - POST: 3.5
MGC ASCENT PFT - FVC - PRE: 3.56
MGC ASCENT PFT - FVC - PREDICTED: 4.49

## 2024-03-08 ENCOUNTER — APPOINTMENT (OUTPATIENT)
Dept: PULMONOLOGY | Facility: CLINIC | Age: 64
End: 2024-03-08
Payer: COMMERCIAL

## 2024-03-15 ASSESSMENT — ENCOUNTER SYMPTOMS
COUGH: 1
UNEXPECTED WEIGHT CHANGE: 0
SORE THROAT: 0
RHINORRHEA: 1
VOICE CHANGE: 0
FEVER: 0
PALPITATIONS: 1
SHORTNESS OF BREATH: 1
BACK PAIN: 1
CHEST TIGHTNESS: 0
CHILLS: 0
DIAPHORESIS: 1
ADENOPATHY: 0
WHEEZING: 1

## 2024-03-15 NOTE — PROGRESS NOTES
Subjective   Patient ID: Marshall Silva is a 63 y.o. male who presents for Follow-up.  HPI  Mr. Silva is a 63M NSCLC, GERD, COPD, tobacco use disorder who is referred to pulmonary for history of lung cancer by Dr. Blake.     Last visit 2/9/24: PET ordered, negative     #NSCLC  #Abnormal lung imaging  #Dyspnea on exertion  He has been out of his trelegy. He called for refill but he was not able to get a refill.   He has only 3 puffs left and has been saving it. This has been going on for about 1 month.   He reports no new symptoms. It varies by the weather.   Triggers: quick weather changes  He thinks he may have some seasonal allergies. When he was just in [place] with the pollen he had trouble. He tried an OTC med but doesn't recall which one.   He uses the VPEP regularly    #Tobacco use disorder  Cut down to 5 cigarettes daily when working, 1 ppd when not working.   He has tried NRT before  His insurance won't pay for chantix and his brother had an intolerance so he refuses.      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 Negative for chills, fever and unexpected weight change.        Night sweat for past 4 years, weight roughly stable since cancer diagnosis but hasn't regained   HENT:  Positive for postnasal drip and rhinorrhea. Negative for congestion, sore throat and voice change.    Respiratory:  Positive for cough, shortness of breath and wheezing. Negative for chest tightness.         No hemoptysis   Cardiovascular:  Positive for palpitations and leg swelling. Negative for chest pain.   Musculoskeletal:  Positive for back pain.   Skin:  Negative for rash.   Allergic/Immunologic: Negative for  environmental allergies.   Neurological:  Positive for weakness and numbness.        4th and 5th digit numbness, Difficulty walking and weakness attributed to spinal disease   Hematological:  Negative for adenopathy.       Objective   Physical Exam  Vitals reviewed.   Constitutional:       Appearance: Normal appearance.   HENT:      Head: Normocephalic and atraumatic.      Nose: Nose normal. No rhinorrhea.      Mouth/Throat:      Mouth: Mucous membranes are dry.      Pharynx: No posterior oropharyngeal erythema.   Eyes:      Extraocular Movements: Extraocular movements intact.   Cardiovascular:      Rate and Rhythm: Normal rate and regular rhythm.      Heart sounds: No murmur heard.  Pulmonary:      Breath sounds: Wheezing present. No rhonchi.   Abdominal:      General: Abdomen is flat.   Musculoskeletal:         General: Normal range of motion.      Cervical back: Neck supple. No tenderness.      Right lower leg: No edema.      Left lower leg: No edema.   Lymphadenopathy:      Cervical: No cervical adenopathy.   Skin:     General: Skin is warm and dry.   Neurological:      General: No focal deficit present.      Mental Status: He is alert and oriented to person, place, and time.      Gait: Gait normal.         PFT 2/29/24:  FVC 3.56 79%, FEV1 1.72 49%, ratio 0.48, no bronchodilator response  TLC 7.37 102%, DLCO 21.85 77%  Moderate obstruction, normal lung volume, normal DLCO    PET 2/15/24  FINDINGS:  NECK:  *No evidence of paranasal sinus disease  *No FDG avid cervical lymphadenopathy is present.  *Unremarkable thyroid gland      CHEST:  *Status post right upper lobectomy, minimal FDG right apical pleural  thickening with SUV max 1.6. Mild FDG avid consolidation in the right  lung apex, measuring 3.1 cm with SUV max 1.5. No concerning pulmonary  nodule in the left lung. *No FDG avid axillary, mediastinal or hilar  node      ABDOMEN AND PELVIS:  * No FDG avid lymphadenopathy in the abdomen or pelvis.  * Physiologic  radiotracer uptake is present in the liver and spleen  with excretion into the bowel loops and the genitourinary tract.  *Unremarkable bilateral adrenal glands.      MUSCULOSKELETAL:  *No concerning FDG avid bone lesion throughout the axial and  appendicular skeleton to suggest osseous metastasis. Non FDG avid  sclerotic lesion in the right femoral neck, likely nonspecific      IMPRESSION:  1. Status post right upper lobectomy, minimal FDG avid consolidation  in the right lung apex as well right apical pleural thickening,  likely representing post treatment changes. Attention on follow-up  study.  2. No other concerning FDG avid disease identified    CT chest 1/29/2024  FINDINGS:  Lungs and Pleura: Redemonstrated findings of right sided partial pneumonectomy suggestive of right upper lobectomy. Similar appearance of right apical pleural thickening. Redemonstrated chronic consolidation versus mass in the right lung apex measuring 3.1 x 1.9 cm, previously measuring 2.9 x 1.5 cm. Mild emphysematous changes in the right lung are again noted. Redemonstrated tree-in-bud opacities throughout the right lower lobe, which are overall similar in appearance. Mild biapical paraseptal pulmonary emphysematous changes are also present.      Mediastinum and axilla: No adenopathy by CT size criteria. No cardiomegaly or pericardial effusion. No thoracic aortic aneurysm.  Mild atherosclerosis.      Visualized Upper Abdomen: No worrisome findings in the visualized upper abdomen.      MSK/Chest Wall: No aggressive bony lesion identified. Chronic appearing deformity of the right distal clavicle.          IMPRESSION:  Redemonstrated postoperative changes of partial right pneumonectomy with associated volume loss in the right lung and rightward mediastinal shift. Overall, there is similar appearance of right apical pleural thickening, with the exception of an area of chronic consolidation versus mass in the right lung apex which has  slightly increased in size. Stable soft tissue fullness in the medial aspect of the right upper lobe along the mediastinal margin where the surgical clips are.      Redemonstrated tree-in-bud opacities in the right lower lobe suggesting atypical infection.   Mild pulmonary emphysematous changes are similar in appearance.    Assessment/Plan   Diagnoses and all orders for this visit:  Chronic obstructive pulmonary disease, unspecified COPD type (CMS/[facility])  -     tiotropium-olodateroL (Stiolto Respimat) 2.5-2.5 mcg/actuation mist inhaler; Inhale 2 Inhalations once daily.  -     Complete Pulmonary Function Test Pre/Post Bronchodialator (Spirometry Pre/Post/DLCO/Lung Volumes); Future  -     fluticasone (Flonase) 50 mcg/actuation nasal spray; Administer 2 sprays into each nostril once daily. Shake gently. Before first use, prime pump. After use, clean tip and replace cap.  -     loratadine (Claritin) 10 mg tablet; Take 1 tablet (10 mg) by mouth once daily.  -     buPROPion SR (Wellbutrin SR) 150 mg 12 hr tablet; Take 1 tablet (150 mg) by mouth 2 times a day. Take once a day for 3 days, then take twice a day for smoking cessation    Mr. Silva is a 63M NSCLC, GERD, COPD?, tobacco use disorder who is referred to pulmonary for history of lung cancer by Dr. Blake.     #NSCLC  #Abnormal lung imaging  Patient can return to regular LDCT while smoking  Can consider surveillance referral to oncology    #Tobacco use disorder  Patient cutting back.  Performed motivational interviewing today. Patient to continue cutting back  Wellbutrin for smoking cessation prescribed. Counseled patient to stop medication and call if he experiences any changes in mood.   He did not want NRT, he did not want to try chantix.     #COPD  Patient with moderate COPD and no exacerbation. He has wheezing and chronic cough but I suspect this is because he has been off his inhalers due to denied refill request.   Start stiolto. No ICS indication right  now  Repeat PFT in 6 months    RTC 3 months to follow inhaler response    Maricel Vega MD 03/22/24 12:56 PM

## 2024-03-22 ENCOUNTER — OFFICE VISIT (OUTPATIENT)
Dept: PULMONOLOGY | Facility: CLINIC | Age: 64
End: 2024-03-22
Payer: COMMERCIAL

## 2024-03-22 VITALS
HEIGHT: 72 IN | HEART RATE: 80 BPM | BODY MASS INDEX: 18.47 KG/M2 | SYSTOLIC BLOOD PRESSURE: 126 MMHG | DIASTOLIC BLOOD PRESSURE: 86 MMHG | OXYGEN SATURATION: 98 % | WEIGHT: 136.4 LBS | RESPIRATION RATE: 18 BRPM

## 2024-03-22 DIAGNOSIS — J44.9 CHRONIC OBSTRUCTIVE PULMONARY DISEASE, UNSPECIFIED COPD TYPE (MULTI): Primary | ICD-10-CM

## 2024-03-22 PROCEDURE — 99214 OFFICE O/P EST MOD 30 MIN: CPT | Performed by: INTERNAL MEDICINE

## 2024-03-22 RX ORDER — LORATADINE 10 MG/1
10 TABLET ORAL DAILY
Qty: 30 TABLET | Refills: 11 | Status: SHIPPED | OUTPATIENT
Start: 2024-03-22 | End: 2025-03-22

## 2024-03-22 RX ORDER — FLUTICASONE PROPIONATE 50 MCG
2 SPRAY, SUSPENSION (ML) NASAL DAILY
Qty: 16 G | Refills: 6 | Status: SHIPPED | OUTPATIENT
Start: 2024-03-22 | End: 2024-09-18

## 2024-03-22 RX ORDER — BUPROPION HYDROCHLORIDE 150 MG/1
150 TABLET, EXTENDED RELEASE ORAL 2 TIMES DAILY
Qty: 60 TABLET | Refills: 11 | Status: SHIPPED | OUTPATIENT
Start: 2024-03-22 | End: 2024-05-16 | Stop reason: WASHOUT

## 2024-03-22 ASSESSMENT — COLUMBIA-SUICIDE SEVERITY RATING SCALE - C-SSRS
6. HAVE YOU EVER DONE ANYTHING, STARTED TO DO ANYTHING, OR PREPARED TO DO ANYTHING TO END YOUR LIFE?: NO
2. HAVE YOU ACTUALLY HAD ANY THOUGHTS OF KILLING YOURSELF?: NO
1. IN THE PAST MONTH, HAVE YOU WISHED YOU WERE DEAD OR WISHED YOU COULD GO TO SLEEP AND NOT WAKE UP?: NO

## 2024-03-22 ASSESSMENT — ENCOUNTER SYMPTOMS
NUMBNESS: 1
DEPRESSION: 0
LOSS OF SENSATION IN FEET: 1
WEAKNESS: 1
OCCASIONAL FEELINGS OF UNSTEADINESS: 1

## 2024-03-22 ASSESSMENT — PATIENT HEALTH QUESTIONNAIRE - PHQ9
SUM OF ALL RESPONSES TO PHQ9 QUESTIONS 1 AND 2: 0
2. FEELING DOWN, DEPRESSED OR HOPELESS: NOT AT ALL
1. LITTLE INTEREST OR PLEASURE IN DOING THINGS: NOT AT ALL

## 2024-03-22 NOTE — PATIENT INSTRUCTIONS
Thank you for coming in today! Please call with questions or concerns.    Please schedule:  - Pulmonary Function Tests in 6 months to check on the function    Please start:  - Stiolto - this is instead of the trelegy - use this daily.   - Flonase nasal spray - also OTC. Takes 2 weeks to get active and this helps with allergies. Aim towards your ears when you give it to yourself  - Claritin - this is also OTC medicine for allergies  - Wellbutrin - this is to help quit smoking. Take once a day for 3 days and then take twice a day. If you notice any change in your mood, call us and stop it.     Keep quitting smoking. You can do it and you need to do it. It will impact your ability to work    Call your neck surgeon about timing of your procedure    Return to clinic in 3 months

## 2024-04-03 LAB
ACID FAST STN SPEC: NORMAL
MYCOBACTERIUM SPEC CULT: NORMAL

## 2024-05-15 ENCOUNTER — APPOINTMENT (OUTPATIENT)
Dept: NEUROLOGY | Facility: CLINIC | Age: 64
End: 2024-05-15
Payer: COMMERCIAL

## 2024-05-16 ENCOUNTER — OFFICE VISIT (OUTPATIENT)
Dept: PRIMARY CARE | Facility: CLINIC | Age: 64
End: 2024-05-16
Payer: COMMERCIAL

## 2024-05-16 ENCOUNTER — HOSPITAL ENCOUNTER (OUTPATIENT)
Dept: RADIOLOGY | Facility: CLINIC | Age: 64
Discharge: HOME | End: 2024-05-16
Payer: COMMERCIAL

## 2024-05-16 VITALS
SYSTOLIC BLOOD PRESSURE: 110 MMHG | DIASTOLIC BLOOD PRESSURE: 66 MMHG | TEMPERATURE: 98.6 F | HEART RATE: 76 BPM | BODY MASS INDEX: 18.15 KG/M2 | WEIGHT: 134 LBS | RESPIRATION RATE: 16 BRPM | OXYGEN SATURATION: 96 % | HEIGHT: 72 IN

## 2024-05-16 DIAGNOSIS — G95.9 CERVICAL MYELOPATHY (MULTI): Primary | ICD-10-CM

## 2024-05-16 DIAGNOSIS — M25.511 ACUTE PAIN OF RIGHT SHOULDER: ICD-10-CM

## 2024-05-16 DIAGNOSIS — J43.9 PULMONARY EMPHYSEMA, UNSPECIFIED EMPHYSEMA TYPE (MULTI): ICD-10-CM

## 2024-05-16 DIAGNOSIS — G95.9 CERVICAL MYELOPATHY (MULTI): ICD-10-CM

## 2024-05-16 PROCEDURE — 73030 X-RAY EXAM OF SHOULDER: CPT | Mod: RT

## 2024-05-16 PROCEDURE — 73030 X-RAY EXAM OF SHOULDER: CPT | Mod: RIGHT SIDE | Performed by: RADIOLOGY

## 2024-05-16 PROCEDURE — 99214 OFFICE O/P EST MOD 30 MIN: CPT

## 2024-05-16 RX ORDER — PREDNISONE 50 MG/1
50 TABLET ORAL DAILY
Qty: 5 TABLET | Refills: 0 | Status: SHIPPED | OUTPATIENT
Start: 2024-05-16 | End: 2024-05-21

## 2024-05-16 RX ORDER — MELOXICAM 15 MG/1
15 TABLET ORAL DAILY
Qty: 30 TABLET | Refills: 0 | Status: SHIPPED | OUTPATIENT
Start: 2024-05-16 | End: 2024-06-15

## 2024-05-16 RX ORDER — CYCLOBENZAPRINE HCL 5 MG
5 TABLET ORAL 3 TIMES DAILY PRN
Qty: 30 TABLET | Refills: 2 | Status: SHIPPED | OUTPATIENT
Start: 2024-05-16 | End: 2025-01-11

## 2024-05-16 RX ORDER — BUDESONIDE, GLYCOPYRROLATE, AND FORMOTEROL FUMARATE 160; 9; 4.8 UG/1; UG/1; UG/1
2 AEROSOL, METERED RESPIRATORY (INHALATION) 2 TIMES DAILY
Qty: 24 G | Refills: 0 | Status: SHIPPED | OUTPATIENT
Start: 2024-05-16 | End: 2024-06-15

## 2024-05-16 NOTE — PATIENT INSTRUCTIONS
Juan R     Nice to meet you.     We will get x-rays.     Please take steroids for 5 days. After this is complete, please start Meloxicam once daily for 14 days. Do not take this with ibuprofen.     If not improving, follow up in 2 weeks. We could consider a shoulder injection.     Please follow up with neurosurgery.

## 2024-05-16 NOTE — PROGRESS NOTES
Subjective   Patient ID: [id number]   Marshall Silva is a 63 y.o. male who presents for Shoulder Pain (Right ).  Patient is here for evaluation of right shoulder pain for the past 36 hours.  He states he woke up on Wednesday morning with 10/10 right shoulder pain.  He has no previous history of right shoulder injury, no previous surgeries.  He denies any new or unusual activities this week, although he does work a physical job that involves quite a bit of lifting.  He has tried ibuprofen 800 mg with minimal relief.  He is having trouble sleeping.  He describes pain as achy, worse with movement, specifically flexion to 90 degrees and pulling towards his body.  He denies numbness/tingling and electric radiation of the pain, states that the pain stops near his bicep/tricep.  Patient denies chest pain, dyspnea, diaphoresis, palpitations, or syncope.  Patient does have a complex past medical history including NSLC s/p lobectomy, severe cervical spine stenosis.  He did meet with Dr. Veto Galicia of neurosurgery who recommended decompression of the cervical spine, however patient has not pursued this at this point.  Patient also had a recent PET scan that did not show any recurrent cancer.  Patient has a long smoking history, and still currently smoking 1 PPD.  He briefly tried Wellbutrin for 7 days but did not like it did anything.  Patient also has severe COPD, was recently taken off Trelegy in favor of Stiolto Respimat by pulmonology.  He feels this is not working as well as the Trelegy.        Review of Systems   Constitutional:  Negative for activity change, appetite change, chills, fatigue and fever.   Eyes:  Negative for pain, discharge, redness and itching.   Respiratory:  Negative for cough, choking, chest tightness, shortness of breath, wheezing and stridor.    Cardiovascular:  Negative for chest pain, palpitations and leg swelling.   Gastrointestinal:  Negative for abdominal distention, abdominal pain,  constipation, diarrhea, nausea and vomiting.   Genitourinary:  Negative for decreased urine volume and urgency.   Musculoskeletal:  Positive for neck pain. Negative for arthralgias, back pain and myalgias.        Right shoulder pain   Skin:  Negative for pallor, rash and wound.   Neurological:  Negative for dizziness, seizures, syncope, facial asymmetry, light-headedness, numbness and headaches.   Psychiatric/Behavioral:  Negative for agitation, behavioral problems, sleep disturbance and suicidal ideas.        Objective   /66 (BP Location: Right arm, Patient Position: Sitting, BP Cuff Size: Adult)   Pulse 76   Temp 37 °C (98.6 °F)   Resp 16   Ht 1.829 m (6')   Wt 60.8 kg (134 lb)   SpO2 96%   BMI 18.17 kg/m²    Physical Exam  Vitals and nursing note reviewed.   Constitutional:       General: He is not in acute distress.     Appearance: Normal appearance. He is ill-appearing. He is not toxic-appearing.      Comments: Cachectic    HENT:      Head: Normocephalic and atraumatic.      Right Ear: External ear normal.      Left Ear: External ear normal.      Nose: Nose normal.      Mouth/Throat:      Mouth: Mucous membranes are moist.   Eyes:      Extraocular Movements: Extraocular movements intact.   Neck:      Comments: Active range of motion severely limited with rotation to left ( <30 degrees from midline)     Cardiovascular:      Rate and Rhythm: Normal rate and regular rhythm.   Pulmonary:      Effort: Pulmonary effort is normal. No respiratory distress.      Breath sounds: No stridor. Examination of the right-upper field reveals wheezing. Examination of the left-upper field reveals wheezing. Examination of the right-middle field reveals wheezing. Examination of the left-middle field reveals wheezing. Examination of the right-lower field reveals wheezing. Examination of the left-lower field reveals wheezing. Wheezing present. No rhonchi.      Comments: Saturating 96% on room air, no conversational  dyspnea  Abdominal:      General: Abdomen is flat.      Tenderness: There is no abdominal tenderness. There is no guarding or rebound.   Musculoskeletal:         General: Normal range of motion.      Cervical back: Neck supple. No edema, erythema, signs of trauma or crepitus. No spinous process tenderness or muscular tenderness.      Comments: TTP rotator cuff insertion, axillary region  Muscle wasting throughout upper extremity R>L secondary to cervical myelopathy  Scapular winging bilaterally R>L   + Painful arc, range of motion limited in abduction to around 135 degrees bilaterally (this is chronic)  Very minimal internal rotation, unable to put right arm behind his back  Positive [place]'s test  Positive Neer's test  Moderate crepitus with loading and range of motion of right glenohumeral joint     Skin:     General: Skin is warm and dry.      Findings: No rash.   Neurological:      General: No focal deficit present.      Mental Status: He is alert and oriented to person, place, and time.         Assessment/Plan   Problem List Items Addressed This Visit       COPD (chronic obstructive pulmonary disease) with emphysema (Multi)    Relevant Medications    budesonide-glycopyr-formoterol (Breztri Aerosphere) 160-9-4.8 mcg/actuation HFA aerosol inhaler     Other Visit Diagnoses       Cervical myelopathy (Multi)    -  Primary    Relevant Medications    predniSONE (Deltasone) 50 mg tablet    meloxicam (Mobic) 15 mg tablet    cyclobenzaprine (Flexeril) 5 mg tablet    Other Relevant Orders    XR shoulder right 2+ views (Completed)    Follow Up In [facility] - PCP - Established    Acute pain of right shoulder        Relevant Medications    predniSONE (Deltasone) 50 mg tablet    meloxicam (Mobic) 15 mg tablet    cyclobenzaprine (Flexeril) 5 mg tablet    Other Relevant Orders    XR shoulder right 2+ views (Completed)    Follow Up In [facility] - PCP - Established             Etiology of right shoulder  pain is likely glenohumeral osteoarthritis with possible impingement and rotator cuff involvement versus worsening of cervical spine pathology.  Will get shoulder x-rays to further investigate and treat with initial course of oral steroids followed by NSAIDs.  Discussed the importance of not taking these medications concurrently.  Discussed the importance of not taking other NSAIDs with steroids or meloxicam.  Could consider right shoulder ultrasound-guided steroid injection at next visit.  I also recommended that the patient follow-up with neurosurgery regarding options as this is likely contributing.  Follow-up in 1 month.    In regards to COPD, will escalate patient back to triple therapy inhaler.    Discussed with attending physician Dr. Doyle.       Oswaldo Molina,

## 2024-05-18 ASSESSMENT — ENCOUNTER SYMPTOMS
CONSTIPATION: 0
PALPITATIONS: 0
ARTHRALGIAS: 0
DIZZINESS: 0
MYALGIAS: 0
NAUSEA: 0
ABDOMINAL PAIN: 0
SLEEP DISTURBANCE: 0
EYE ITCHING: 0
SEIZURES: 0
WHEEZING: 0
DIARRHEA: 0
NUMBNESS: 0
FACIAL ASYMMETRY: 0
FATIGUE: 0
HEADACHES: 0
NECK PAIN: 1
FEVER: 0
CHILLS: 0
CHEST TIGHTNESS: 0
APPETITE CHANGE: 0
CHOKING: 0
EYE REDNESS: 0
SHORTNESS OF BREATH: 0
COUGH: 0
VOMITING: 0
EYE PAIN: 0
BACK PAIN: 0
STRIDOR: 0
WOUND: 0
EYE DISCHARGE: 0
LIGHT-HEADEDNESS: 0
AGITATION: 0
ACTIVITY CHANGE: 0
ABDOMINAL DISTENTION: 0

## 2024-05-20 NOTE — PROGRESS NOTES
I reviewed the resident/fellow's documentation and discussed the patient with the resident/fellow. I agree with the resident/fellow's medical decision making as documented in the note.     Prema Doyle MD

## 2024-05-22 ENCOUNTER — TELEPHONE (OUTPATIENT)
Dept: PRIMARY CARE | Facility: CLINIC | Age: 64
End: 2024-05-22
Payer: COMMERCIAL

## 2024-06-10 ENCOUNTER — APPOINTMENT (OUTPATIENT)
Dept: PRIMARY CARE | Facility: CLINIC | Age: 64
End: 2024-06-10
Payer: COMMERCIAL

## 2024-06-19 ENCOUNTER — HOSPITAL ENCOUNTER (OUTPATIENT)
Dept: RADIOLOGY | Facility: CLINIC | Age: 64
Discharge: HOME | End: 2024-06-19
Payer: COMMERCIAL

## 2024-06-19 ENCOUNTER — OFFICE VISIT (OUTPATIENT)
Dept: ORTHOPEDIC SURGERY | Facility: CLINIC | Age: 64
End: 2024-06-19
Payer: COMMERCIAL

## 2024-06-19 VITALS — HEIGHT: 71 IN | WEIGHT: 145 LBS | BODY MASS INDEX: 20.3 KG/M2

## 2024-06-19 DIAGNOSIS — M79.674 GREAT TOE PAIN, RIGHT: ICD-10-CM

## 2024-06-19 DIAGNOSIS — M79.674 GREAT TOE PAIN, RIGHT: Primary | ICD-10-CM

## 2024-06-19 PROCEDURE — 28510 TREATMENT OF TOE FRACTURE: CPT | Performed by: INTERNAL MEDICINE

## 2024-06-19 PROCEDURE — 28490 TREAT BIG TOE FRACTURE: CPT | Performed by: INTERNAL MEDICINE

## 2024-06-19 PROCEDURE — 73660 X-RAY EXAM OF TOE(S): CPT | Mod: RT

## 2024-06-19 PROCEDURE — 99213 OFFICE O/P EST LOW 20 MIN: CPT | Mod: 57 | Performed by: INTERNAL MEDICINE

## 2024-06-19 PROCEDURE — 73660 X-RAY EXAM OF TOE(S): CPT | Mod: RIGHT SIDE | Performed by: INTERNAL MEDICINE

## 2024-06-19 PROCEDURE — 99204 OFFICE O/P NEW MOD 45 MIN: CPT | Performed by: INTERNAL MEDICINE

## 2024-06-19 NOTE — PROGRESS NOTES
Acute Injury New Patient Visit    [facility]:   Chief Complaint   Patient presents with    Right Great Toe - Pain     Patient states he hit a coffee table on 06/05/24,pain inh right great toe and second toe, xrays at [facility]       HPI: Marshall is a 63 y.o. male presents today for evaluation for acute right great toe injury sustained 2-3 weeks ago. He states that he hit a coffee table with his right great toe. He went to urgent care where x-rays were taken and was diagnosed with nondisplaced fracture of distal phalanx of right great toe. He is here for initial evaluation and repeat x-rays.         Review of Systems   GENERAL: Negative for malaise, significant weight loss, fever  MUSCULOSKELETAL: See HPI  NEURO:  Negative for numbness / tingling     Past Medical History  Past Medical History:   Diagnosis Date    Personal history of other diseases of the respiratory system 01/08/2021    History of chronic obstructive lung disease       Medication review  Medication Documentation Review Audit       Reviewed by Oswaldo Molina DO (Resident) on 05/16/24 at 1407      Medication Order Taking? Sig Documenting Provider Last Dose Status   albuterol (Ventolin HFA) 90 mcg/actuation inhaler [id number] Yes Inhale 2 puffs every 4 hours if needed for wheezing or shortness of breath. Maricel Vega MD Taking Active   buPROPion SR (Wellbutrin SR) 150 mg 12 hr tablet [id number] No Take 1 tablet (150 mg) by mouth 2 times a day. Take once a day for 3 days, then take twice a day for smoking cessation   Patient not taking: Reported on 5/16/2024    Maricel Vega MD Not Taking Active   fluticasone (Flonase) 50 mcg/actuation nasal spray [id number] Yes Administer 2 sprays into each nostril once daily. Shake gently. Before first use, prime pump. After use, clean tip and replace cap. Maricel Vega MD Taking Active   fluticasone-umeclidin-vilanter (Trelegy Ellipta) 200-62.5-25 mcg blister with device [id number] No Inhale 1 puff once daily. Historical  Provider, MD Not Taking Active   loratadine (Claritin) 10 mg tablet [id number] Yes Take 1 tablet (10 mg) by mouth once daily. Maricel Vega MD Taking Active   omeprazole (PriLOSEC) 20 mg DR capsule [id number] Yes Take 1 capsule (20 mg) by mouth once daily in the morning. Take before meals. Historical Provider, MD Taking Active   tiotropium-olodateroL (Stiolto Respimat) 2.5-2.5 mcg/actuation mist inhaler [id number] Yes Inhale 2 Inhalations once daily. Maricel Vega MD Taking Active                    Allergies  No Known Allergies    Social History  Social History     Socioeconomic History    Marital status:      Spouse name: Not on file    Number of children: Not on file    Years of education: Not on file    Highest education level: Not on file   Occupational History    Not on file   Tobacco Use    Smoking status: Every Day     Types: Cigarettes    Smokeless tobacco: Not on file   Vaping Use    Vaping status: Never Used   Substance and Sexual Activity    Alcohol use: Yes     Alcohol/week: 2.0 standard drinks of alcohol     Types: 2 Cans of beer per week    Drug use: Never    Sexual activity: Not on file   Other Topics Concern    Not on file   Social History Narrative    Not on file     Social Determinants of Health     Financial Resource Strain: Low Risk  (1/23/2024)    Overall Financial Resource Strain (CARDIA)     Difficulty of Paying Living Expenses: Not hard at all   Food Insecurity: No Food Insecurity (1/23/2024)    Hunger Vital Sign     Worried About Running Out of Food in the Last Year: Never true     Ran Out of Food in the Last Year: Never true   Transportation Needs: No Transportation Needs (1/23/2024)    PRAPARE - Transportation     Lack of Transportation (Medical): No     Lack of Transportation (Non-Medical): No   Physical Activity: Sufficiently Active (1/23/2024)    Exercise Vital Sign     Days of Exercise per Week: 7 days     Minutes of Exercise per Session: 60 min   Stress: No Stress Concern  Present (1/23/2024)    [employer] - Occupational Stress Questionnaire     Feeling of Stress : Not at all   Social Connections: Socially Isolated (1/23/2024)    Social Connection and Isolation Panel [NHANES]     Frequency of Communication with Friends and Family: More than three times a week     Frequency of Social Gatherings with Friends and Family: Not on file     Attends Islam Services: Never     Active Member of Clubs or Organizations: No     Attends Club or Organization Meetings: Never     Marital Status:    Intimate Partner Violence: Not At Risk (1/23/2024)    Humiliation, Afraid, Rape, and Kick questionnaire     Fear of Current or Ex-Partner: No     Emotionally Abused: No     Physically Abused: No     Sexually Abused: No   Housing Stability: Unknown (1/23/2024)    Housing Stability Vital Sign     Unable to Pay for Housing in the Last Year: No     Number of Places Lived in the Last Year: Not on file     Unstable Housing in the Last Year: No       Surgical History  Past Surgical History:   Procedure Laterality Date    OTHER SURGICAL HISTORY  01/08/2021    Lung lobectomy       Physical Exam:  GENERAL:  Patient is awake, alert, and oriented to person place and time.  Patient appears well nourished and well kept.  Affect Calm, Not Acutely Distressed.  HEENT:  Normocephalic, Atraumatic, EOMI  CARDIOVASCULAR:  Hemodynamically stable.  RESPIRATORY:  Normal respirations with unlabored breathing.  Extremity: Right foot examination shows skin is intact.  There is no erythema or warmth.  Mild swelling the right great toe, nailbed is intact no obvious deformity.  There is no clinical signs of infection.  There is no pain over the base of the fifth metatarsal bone.  There is no pain in the midfoot.  No pain over the metatarsal bones.  No pain of the cuboid bone.  There is no pain in the calcaneus.  There is no pain over the plantar aponeurosis.  There is no pain over the proximal  phalanx of the right great toe.  Mild pain over distal phalanx of the right great toe.  Neurovascularly intact.      Diagnostics: X-rays reviewed      Procedure: None    Assessment: Nondisplaced fracture of the distal phalanx of right great toe     Plan: Marshall presents today for initial evaluation for acute right great toe injury sustained about 2-3 weeks ago. Repeat x-rays showed a satisfactory healing fracture with increased callus formation. We recommended to continue with non surgical treatment, he will wear well supported and rigid shoes.  Begin some gentle passive range of motion exercises, he will follow-up as needed.  Good prognosis.    No orders of the defined types were placed in this encounter.     At the conclusion of the visit there were no further questions by the patient/family regarding their plan of care.  Patient was instructed to call or return with any issues, questions, or concerns regarding their injury and/or treatment plan described above.     06/19/24 at 10:48 AM - Omkar Porter MD  Scribe Attestation  By signing my name below, I, Tu [place], Scribe   attest that this documentation has been prepared under the direction and in the presence of Omkar Porter MD.    Office: [phone]    This note was prepared using voice recognition software.  The details of this note are correct and have been reviewed, and corrected to the best of my ability.  Some grammatical errors may persist related to the Dragon software.

## 2024-06-20 ENCOUNTER — APPOINTMENT (OUTPATIENT)
Dept: PRIMARY CARE | Facility: CLINIC | Age: 64
End: 2024-06-20
Payer: COMMERCIAL

## 2024-06-20 VITALS
OXYGEN SATURATION: 95 % | DIASTOLIC BLOOD PRESSURE: 80 MMHG | SYSTOLIC BLOOD PRESSURE: 129 MMHG | WEIGHT: 133 LBS | TEMPERATURE: 97.5 F | BODY MASS INDEX: 18.55 KG/M2 | RESPIRATION RATE: 20 BRPM | HEART RATE: 94 BPM

## 2024-06-20 DIAGNOSIS — G43.109 MIGRAINE WITH AURA AND WITHOUT STATUS MIGRAINOSUS, NOT INTRACTABLE: Primary | ICD-10-CM

## 2024-06-20 DIAGNOSIS — G95.9 CERVICAL MYELOPATHY (MULTI): ICD-10-CM

## 2024-06-20 DIAGNOSIS — M25.511 ACUTE PAIN OF RIGHT SHOULDER: ICD-10-CM

## 2024-06-20 PROCEDURE — 99214 OFFICE O/P EST MOD 30 MIN: CPT

## 2024-06-20 RX ORDER — OMEPRAZOLE 20 MG/1
20 CAPSULE, DELAYED RELEASE ORAL
Qty: 90 CAPSULE | Refills: 0 | Status: SHIPPED | OUTPATIENT
Start: 2024-06-20 | End: 2024-09-18

## 2024-06-20 RX ORDER — CYCLOBENZAPRINE HCL 5 MG
10 TABLET ORAL NIGHTLY PRN
Qty: 30 TABLET | Refills: 0 | Status: SHIPPED | OUTPATIENT
Start: 2024-06-20 | End: 2024-09-18

## 2024-06-20 RX ORDER — MELOXICAM 15 MG/1
15 TABLET ORAL DAILY
Qty: 90 TABLET | Refills: 0 | Status: SHIPPED | OUTPATIENT
Start: 2024-06-20 | End: 2024-09-18

## 2024-06-20 RX ORDER — SUMATRIPTAN 50 MG/1
50 TABLET, FILM COATED ORAL ONCE AS NEEDED
Qty: 90 TABLET | Refills: 0 | Status: SHIPPED | OUTPATIENT
Start: 2024-06-20 | End: 2024-09-18

## 2024-06-20 ASSESSMENT — ENCOUNTER SYMPTOMS
STRIDOR: 0
PALPITATIONS: 0
FACIAL ASYMMETRY: 0
SLEEP DISTURBANCE: 0
CHEST TIGHTNESS: 0
SINUS PAIN: 0
VOMITING: 0
AGITATION: 0
ABDOMINAL DISTENTION: 0
NUMBNESS: 0
ACTIVITY CHANGE: 0
FATIGUE: 0
ARTHRALGIAS: 0
LIGHT-HEADEDNESS: 0
APPETITE CHANGE: 0
EYE REDNESS: 0
SORE THROAT: 0
EYE DISCHARGE: 0
NAUSEA: 0
POLYPHAGIA: 0
RHINORRHEA: 0
CHOKING: 0
EYE PAIN: 0
SEIZURES: 0
CHILLS: 0
CONSTIPATION: 0
EYE ITCHING: 0
MYALGIAS: 0
SINUS PRESSURE: 0
DIARRHEA: 0
WOUND: 0
HEADACHES: 1
DIZZINESS: 0
ABDOMINAL PAIN: 0
FREQUENCY: 0
DYSURIA: 0
WHEEZING: 1
BACK PAIN: 0
FEVER: 0
DIFFICULTY URINATING: 0
POLYDIPSIA: 0
COUGH: 0
SHORTNESS OF BREATH: 0

## 2024-06-20 NOTE — PROGRESS NOTES
Subjective   Patient ID: [id number] 1960   Marshall Silva is a 63 y.o. male who presents for Follow-up (1 month follow up right shoulder.) and Migraine (Migraines ).  Pt is here to follow up for right shoulder pain and to discuss migraines.   He states shoulder pain is much improved.  He is taking meloxicam.  He has not followed up with neurosurgery to discuss cervical spine procedure.  He does state the pain is worse at night.  He is currently off of work due to a toe fracture.  Patient also describes previous history of migraines.  He describes the headache as right-sided, unilateral, pulsating, lasting 4-5 hours, worse with movement, and associated with photophobia, phonophobia, and blurred vision.  He denies nausea or vomiting associated with this.  In the past he was treated with Percocet for this in [place].  He has also tried ibuprofen which does not seem to help.  He has never been on a triptan.  He gets these headaches typically between 1 and 5 times per month.  He has been taking Breztri for COPD.  He has a follow-up appointment with pulmonology in 1 week.  He feels his breathing is at the baseline. He does continue to smoke.         Review of Systems   Constitutional:  Negative for activity change, appetite change, chills, fatigue and fever.   HENT:  Negative for congestion, dental problem, ear pain, mouth sores, postnasal drip, rhinorrhea, sinus pressure, sinus pain, sneezing and sore throat.    Eyes:  Negative for pain, discharge, redness and itching.   Respiratory:  Positive for wheezing. Negative for cough, choking, chest tightness, shortness of breath and stridor.    Cardiovascular:  Negative for chest pain, palpitations and leg swelling.   Gastrointestinal:  Negative for abdominal distention, abdominal pain, constipation, diarrhea, nausea and vomiting.   Endocrine: Negative for polydipsia, polyphagia and polyuria.   Genitourinary:  Negative for decreased urine volume, difficulty urinating,  dysuria, enuresis, frequency and urgency.   Musculoskeletal:  Negative for arthralgias, back pain and myalgias.   Skin:  Negative for pallor, rash and wound.   Neurological:  Positive for headaches. Negative for dizziness, seizures, syncope, facial asymmetry, light-headedness and numbness.   Psychiatric/Behavioral:  Negative for agitation, behavioral problems, sleep disturbance and suicidal ideas.        Objective   /80 (BP Location: Right arm, Patient Position: Sitting)   Pulse 94   Temp 36.4 °C (97.5 °F)   Resp 20   Wt 60.3 kg (133 lb)   SpO2 95%   BMI 18.55 kg/m²    Physical Exam  Vitals and nursing note reviewed.   Constitutional:       General: He is not in acute distress.     Appearance: Normal appearance. He is not ill-appearing or toxic-appearing.   HENT:      Head: Normocephalic and atraumatic.      Right Ear: External ear normal.      Left Ear: External ear normal.      Nose: Nose normal.      Mouth/Throat:      Mouth: Mucous membranes are moist.   Eyes:      Extraocular Movements: Extraocular movements intact.   Cardiovascular:      Rate and Rhythm: Normal rate and regular rhythm.   Pulmonary:      Effort: Pulmonary effort is normal. No respiratory distress.      Breath sounds: No stridor. Wheezing present. No rhonchi.   Abdominal:      General: Abdomen is flat.      Tenderness: There is no abdominal tenderness. There is no guarding or rebound.   Musculoskeletal:      Cervical back: No spinous process tenderness or muscular tenderness. Decreased range of motion.      Comments: Cervical back: Neck supple. No edema, erythema, signs of trauma or crepitus. No spinous process tenderness or muscular tenderness.      Comments: TTP rotator cuff insertion, axillary region  Muscle wasting throughout upper extremity R>L secondary to cervical myelopathy  Scapular winging bilaterally R>L   Negative painful arc, range of motion limited in abduction to around 135 degrees bilaterally (this is  chronic)  Negative [place]'s test  Negative Neer's test  Negative rotator cuff testing     Skin:     General: Skin is warm and dry.      Findings: No rash.   Neurological:      General: No focal deficit present.      Mental Status: He is alert and oriented to person, place, and time.         Assessment/Plan   Problem List Items Addressed This Visit    None  Visit Diagnoses       Migraine with aura and without status migrainosus, not intractable    -  Primary    Relevant Medications    SUMAtriptan (Imitrex) 50 mg tablet    Cervical myelopathy (Multi)        Relevant Medications    omeprazole (PriLOSEC) 20 mg DR capsule    meloxicam (Mobic) 15 mg tablet    cyclobenzaprine (Flexeril) 5 mg tablet    Other Relevant Orders    Follow Up In [facility] - PCP - Established    Acute pain of right shoulder        Relevant Medications    meloxicam (Mobic) 15 mg tablet    cyclobenzaprine (Flexeril) 5 mg tablet          Will continue Mobic and Flexeril for cervical myelopathy.  Discussed the importance of following up with neurosurgery. Does seem likely that majority of right shoulder pain is secondary to cervical spine degenerative disease.     Will trial Imitrex for migraine abortive treatment.    Follow up in 1 month.     Discussed with attending physician Dr. Doyle.      Oswaldo Molina DO

## 2024-06-21 ENCOUNTER — APPOINTMENT (OUTPATIENT)
Dept: PULMONOLOGY | Facility: CLINIC | Age: 64
End: 2024-06-21
Payer: COMMERCIAL

## 2024-06-25 ENCOUNTER — HOSPITAL ENCOUNTER (OUTPATIENT)
Dept: RADIOLOGY | Facility: EXTERNAL LOCATION | Age: 64
Discharge: HOME | End: 2024-06-25

## 2024-06-27 ENCOUNTER — APPOINTMENT (OUTPATIENT)
Dept: PULMONOLOGY | Facility: CLINIC | Age: 64
End: 2024-06-27
Payer: COMMERCIAL

## 2024-08-13 ENCOUNTER — TELEPHONE (OUTPATIENT)
Dept: PRIMARY CARE | Facility: CLINIC | Age: 64
End: 2024-08-13
Payer: COMMERCIAL

## 2024-08-13 NOTE — TELEPHONE ENCOUNTER
Rx Refill Request Telephone Encounter    Name:  Marshall Silva  :  [id number]  Medication Name:    omeprazole (PriLOSEC) 20 mg DR capsule     cyclobenzaprine (Flexeril) 5 mg tablet       Pt. Wants something stronger than Flexeril... having to take 2.      Specific Pharmacy location:    [id number] - [facility] [address] AT [facility] [address]  [address], [facility] [address]  Phone: [phone]  Fax: [phone]  [place] #: [id number]

## 2024-08-15 DIAGNOSIS — G43.109 MIGRAINE WITH AURA AND WITHOUT STATUS MIGRAINOSUS, NOT INTRACTABLE: ICD-10-CM

## 2024-08-15 DIAGNOSIS — G95.9 CERVICAL MYELOPATHY (MULTI): ICD-10-CM

## 2024-08-15 DIAGNOSIS — M25.511 ACUTE PAIN OF RIGHT SHOULDER: ICD-10-CM

## 2024-08-15 NOTE — TELEPHONE ENCOUNTER
MEDICATION REFILL:       cyclobenzaprine (Flexeril) 5 mg tablet [[id number]]    Order Details  Dose: 10 mg Route: oral Frequency: Nightly PRN for muscle spasms   Dispense Quantity: 30 tablet Refills: 0          Sig: Take 2 tablets (10 mg) by mouth as needed at bedtime for muscle spasms.   SUMAtriptan (Imitrex) 50 mg tablet [[id number]]    Order Details  Dose: 50 mg Route: oral Frequency: Once as needed for migraine   Dispense Quantity: 90 tablet Refills: 0          Sig: Take 1 tablet (50 mg) by mouth 1 time if needed for migraine. May repeat after 2 hours.     meloxicam (Mobic) 15 mg tablet [[id number]]    Order Details    Dose: 15 mg Route: oral Frequency: Daily   Dispense Quantity: 90 tablet Refills: 0          Sig: Take 1 tablet (15 mg) by mouth once daily.           [id number] - [place], [place] - [address] AT [facility] [address]  [address], [facility] [address]  Phone: [phone]  Fax: [phone]

## 2024-08-21 DIAGNOSIS — G95.9 CERVICAL MYELOPATHY (MULTI): ICD-10-CM

## 2024-08-21 RX ORDER — TIZANIDINE HYDROCHLORIDE 4 MG/1
4 CAPSULE, GELATIN COATED ORAL 3 TIMES DAILY
Qty: 90 CAPSULE | Refills: 0 | Status: SHIPPED | OUTPATIENT
Start: 2024-08-21 | End: 2024-09-20

## 2024-08-21 RX ORDER — OMEPRAZOLE 20 MG/1
20 CAPSULE, DELAYED RELEASE ORAL
Qty: 90 CAPSULE | Refills: 0 | Status: SHIPPED | OUTPATIENT
Start: 2024-08-21 | End: 2024-11-19

## 2024-08-23 RX ORDER — MELOXICAM 15 MG/1
15 TABLET ORAL DAILY
Qty: 90 TABLET | Refills: 0 | Status: CANCELLED | OUTPATIENT
Start: 2024-08-23 | End: 2024-11-21

## 2024-08-23 RX ORDER — SUMATRIPTAN 50 MG/1
50 TABLET, FILM COATED ORAL ONCE AS NEEDED
Qty: 90 TABLET | Refills: 0 | Status: SHIPPED | OUTPATIENT
Start: 2024-08-23 | End: 2024-11-21

## 2024-08-23 RX ORDER — CYCLOBENZAPRINE HCL 5 MG
10 TABLET ORAL NIGHTLY PRN
Qty: 30 TABLET | Refills: 0 | OUTPATIENT
Start: 2024-08-23 | End: 2024-11-21

## 2024-09-26 ENCOUNTER — TELEPHONE (OUTPATIENT)
Dept: PRIMARY CARE | Facility: CLINIC | Age: 64
End: 2024-09-26
Payer: COMMERCIAL

## 2024-09-26 DIAGNOSIS — G95.9 CERVICAL MYELOPATHY: ICD-10-CM

## 2024-09-26 RX ORDER — OMEPRAZOLE 20 MG/1
20 CAPSULE, DELAYED RELEASE ORAL
Qty: 90 CAPSULE | Refills: 0 | Status: SHIPPED | OUTPATIENT
Start: 2024-09-26 | End: 2024-12-25

## 2024-09-26 RX ORDER — TIZANIDINE HYDROCHLORIDE 4 MG/1
4 CAPSULE, GELATIN COATED ORAL 3 TIMES DAILY
Qty: 90 CAPSULE | Refills: 0 | Status: SHIPPED | OUTPATIENT
Start: 2024-09-26 | End: 2024-10-26

## 2024-09-26 NOTE — TELEPHONE ENCOUNTER
MEDICATION REFILL :    cyclobenzaprine (Flexeril) 5 mg tablet [[id number]]      Order Details    Dose: 10 mg Route: oral Frequency: Nightly PRN for muscle spasms   Dispense Quantity: 30 tablet Refills: 0          Sig: Take 2 tablets (10 mg) by mouth as needed at bedtime for muscle spasms.         omeprazole (PriLOSEC) 20 mg DR capsule [[id number]]    Order Details  Dose: 20 mg Route: oral Frequency: Daily before breakfast   Dispense Quantity: 90 capsule Refills: 0          Sig: Take 1 capsule (20 mg) by mouth once daily in the morning. Take before meals.     [id number] - [facility] [address] AT [facility] & [address], [facility] [address]  Phone: [phone]  Fax: [phone]

## 2024-09-27 ENCOUNTER — TELEPHONE (OUTPATIENT)
Dept: PRIMARY CARE | Facility: CLINIC | Age: 64
End: 2024-09-27
Payer: COMMERCIAL

## 2024-09-27 NOTE — TELEPHONE ENCOUNTER
Pamela is requesting a drug change. States Tizanidine 4mg caps are not covered by insurance. They prefer the tablets.  Can you change?

## 2024-09-30 DIAGNOSIS — G95.9 CERVICAL MYELOPATHY: Primary | ICD-10-CM

## 2024-09-30 RX ORDER — TIZANIDINE 4 MG/1
4 TABLET ORAL EVERY 6 HOURS PRN
Qty: 30 TABLET | Refills: 0 | Status: SHIPPED | OUTPATIENT
Start: 2024-09-30 | End: 2024-10-10

## 2024-12-09 ENCOUNTER — TELEPHONE (OUTPATIENT)
Dept: PRIMARY CARE | Facility: CLINIC | Age: 64
End: 2024-12-09
Payer: COMMERCIAL

## 2024-12-09 DIAGNOSIS — G95.9 CERVICAL MYELOPATHY: ICD-10-CM

## 2024-12-09 NOTE — TELEPHONE ENCOUNTER
Rx Refill Request Telephone Encounter    Name:  Marshall Silva  :  [id number]  Medication Name:    tiZANidine and omeprazole             Specific Pharmacy location:    [facility] #[id number]     Date of last appointment:  24  Date of next appointment:    Best number to reach patient:

## 2024-12-12 RX ORDER — OMEPRAZOLE 20 MG/1
20 CAPSULE, DELAYED RELEASE ORAL
Qty: 90 CAPSULE | Refills: 0 | Status: SHIPPED | OUTPATIENT
Start: 2024-12-12 | End: 2025-03-12

## 2024-12-12 RX ORDER — TIZANIDINE 4 MG/1
4 TABLET ORAL EVERY 6 HOURS PRN
Qty: 30 TABLET | Refills: 0 | Status: SHIPPED | OUTPATIENT
Start: 2024-12-12 | End: 2024-12-22

## 2025-04-20 ENCOUNTER — APPOINTMENT (OUTPATIENT)
Dept: RADIOLOGY | Facility: HOSPITAL | Age: 65
End: 2025-04-20
Payer: COMMERCIAL

## 2025-04-20 ENCOUNTER — APPOINTMENT (OUTPATIENT)
Dept: CARDIOLOGY | Facility: HOSPITAL | Age: 65
End: 2025-04-20
Payer: COMMERCIAL

## 2025-04-20 ENCOUNTER — HOSPITAL ENCOUNTER (EMERGENCY)
Facility: HOSPITAL | Age: 65
Discharge: HOME | End: 2025-04-20
Attending: STUDENT IN AN ORGANIZED HEALTH CARE EDUCATION/TRAINING PROGRAM
Payer: COMMERCIAL

## 2025-04-20 VITALS
HEART RATE: 85 BPM | SYSTOLIC BLOOD PRESSURE: 148 MMHG | WEIGHT: 133 LBS | TEMPERATURE: 97.9 F | HEIGHT: 72 IN | BODY MASS INDEX: 18.01 KG/M2 | RESPIRATION RATE: 17 BRPM | OXYGEN SATURATION: 96 % | DIASTOLIC BLOOD PRESSURE: 54 MMHG

## 2025-04-20 DIAGNOSIS — J18.9 PNEUMONIA OF RIGHT LOWER LOBE DUE TO INFECTIOUS ORGANISM: Primary | ICD-10-CM

## 2025-04-20 DIAGNOSIS — J44.1 COPD EXACERBATION (MULTI): ICD-10-CM

## 2025-04-20 LAB
ALBUMIN SERPL BCP-MCNC: 3.7 G/DL (ref 3.4–5)
ALP SERPL-CCNC: 61 U/L (ref 33–136)
ALT SERPL W P-5'-P-CCNC: 10 U/L (ref 10–52)
ANION GAP SERPL CALC-SCNC: 12 MMOL/L (ref 10–20)
AST SERPL W P-5'-P-CCNC: 14 U/L (ref 9–39)
ATRIAL RATE: 85 BPM
BASOPHILS # BLD AUTO: 0.05 X10*3/UL (ref 0–0.1)
BASOPHILS NFR BLD AUTO: 0.4 %
BILIRUB SERPL-MCNC: 0.4 MG/DL (ref 0–1.2)
BNP SERPL-MCNC: 53 PG/ML (ref 0–99)
BUN SERPL-MCNC: 9 MG/DL (ref 6–23)
CALCIUM SERPL-MCNC: 9 MG/DL (ref 8.6–10.3)
CARDIAC TROPONIN I PNL SERPL HS: 5 NG/L (ref 0–20)
CARDIAC TROPONIN I PNL SERPL HS: 5 NG/L (ref 0–20)
CHLORIDE SERPL-SCNC: 94 MMOL/L (ref 98–107)
CO2 SERPL-SCNC: 31 MMOL/L (ref 21–32)
CREAT SERPL-MCNC: 0.51 MG/DL (ref 0.5–1.3)
EGFRCR SERPLBLD CKD-EPI 2021: >90 ML/MIN/1.73M*2
EOSINOPHIL # BLD AUTO: 0.04 X10*3/UL (ref 0–0.7)
EOSINOPHIL NFR BLD AUTO: 0.3 %
ERYTHROCYTE [DISTWIDTH] IN BLOOD BY AUTOMATED COUNT: 13.3 % (ref 11.5–14.5)
FLUAV RNA RESP QL NAA+PROBE: NOT DETECTED
FLUBV RNA RESP QL NAA+PROBE: NOT DETECTED
GLUCOSE SERPL-MCNC: 96 MG/DL (ref 74–99)
HCT VFR BLD AUTO: 41.6 % (ref 41–52)
HGB BLD-MCNC: 13.9 G/DL (ref 13.5–17.5)
IMM GRANULOCYTES # BLD AUTO: 0.13 X10*3/UL (ref 0–0.7)
IMM GRANULOCYTES NFR BLD AUTO: 0.9 % (ref 0–0.9)
LYMPHOCYTES # BLD AUTO: 1.12 X10*3/UL (ref 1.2–4.8)
LYMPHOCYTES NFR BLD AUTO: 8 %
MAGNESIUM SERPL-MCNC: 1.98 MG/DL (ref 1.6–2.4)
MCH RBC QN AUTO: 33.4 PG (ref 26–34)
MCHC RBC AUTO-ENTMCNC: 33.4 G/DL (ref 32–36)
MCV RBC AUTO: 100 FL (ref 80–100)
MONOCYTES # BLD AUTO: 1.04 X10*3/UL (ref 0.1–1)
MONOCYTES NFR BLD AUTO: 7.4 %
NEUTROPHILS # BLD AUTO: 11.68 X10*3/UL (ref 1.2–7.7)
NEUTROPHILS NFR BLD AUTO: 83 %
NRBC BLD-RTO: 0 /100 WBCS (ref 0–0)
P AXIS: 71 DEGREES
P OFFSET: 189 MS
P ONSET: 141 MS
PLATELET # BLD AUTO: 302 X10*3/UL (ref 150–450)
POTASSIUM SERPL-SCNC: 4.2 MMOL/L (ref 3.5–5.3)
PR INTERVAL: 162 MS
PROT SERPL-MCNC: 7.7 G/DL (ref 6.4–8.2)
Q ONSET: 222 MS
QRS COUNT: 14 BEATS
QRS DURATION: 78 MS
QT INTERVAL: 362 MS
QTC CALCULATION(BAZETT): 430 MS
QTC FREDERICIA: 406 MS
R AXIS: 72 DEGREES
RBC # BLD AUTO: 4.16 X10*6/UL (ref 4.5–5.9)
SARS-COV-2 RNA RESP QL NAA+PROBE: NOT DETECTED
SODIUM SERPL-SCNC: 133 MMOL/L (ref 136–145)
T AXIS: 64 DEGREES
T OFFSET: 403 MS
VENTRICULAR RATE: 85 BPM
WBC # BLD AUTO: 14.1 X10*3/UL (ref 4.4–11.3)

## 2025-04-20 PROCEDURE — 85025 COMPLETE CBC W/AUTO DIFF WBC: CPT

## 2025-04-20 PROCEDURE — 83735 ASSAY OF MAGNESIUM: CPT

## 2025-04-20 PROCEDURE — 71046 X-RAY EXAM CHEST 2 VIEWS: CPT | Performed by: RADIOLOGY

## 2025-04-20 PROCEDURE — 2550000001 HC RX 255 CONTRASTS: Performed by: STUDENT IN AN ORGANIZED HEALTH CARE EDUCATION/TRAINING PROGRAM

## 2025-04-20 PROCEDURE — 71046 X-RAY EXAM CHEST 2 VIEWS: CPT

## 2025-04-20 PROCEDURE — 36415 COLL VENOUS BLD VENIPUNCTURE: CPT

## 2025-04-20 PROCEDURE — 83880 ASSAY OF NATRIURETIC PEPTIDE: CPT

## 2025-04-20 PROCEDURE — 2500000001 HC RX 250 WO HCPCS SELF ADMINISTERED DRUGS (ALT 637 FOR MEDICARE OP): Performed by: STUDENT IN AN ORGANIZED HEALTH CARE EDUCATION/TRAINING PROGRAM

## 2025-04-20 PROCEDURE — 84484 ASSAY OF TROPONIN QUANT: CPT

## 2025-04-20 PROCEDURE — 2500000004 HC RX 250 GENERAL PHARMACY W/ HCPCS (ALT 636 FOR OP/ED): Mod: JZ

## 2025-04-20 PROCEDURE — 71275 CT ANGIOGRAPHY CHEST: CPT

## 2025-04-20 PROCEDURE — 93005 ELECTROCARDIOGRAM TRACING: CPT

## 2025-04-20 PROCEDURE — 2500000002 HC RX 250 W HCPCS SELF ADMINISTERED DRUGS (ALT 637 FOR MEDICARE OP, ALT 636 FOR OP/ED)

## 2025-04-20 PROCEDURE — 84132 ASSAY OF SERUM POTASSIUM: CPT

## 2025-04-20 PROCEDURE — 94640 AIRWAY INHALATION TREATMENT: CPT

## 2025-04-20 PROCEDURE — 99285 EMERGENCY DEPT VISIT HI MDM: CPT | Performed by: STUDENT IN AN ORGANIZED HEALTH CARE EDUCATION/TRAINING PROGRAM

## 2025-04-20 PROCEDURE — 99285 EMERGENCY DEPT VISIT HI MDM: CPT | Mod: 25 | Performed by: STUDENT IN AN ORGANIZED HEALTH CARE EDUCATION/TRAINING PROGRAM

## 2025-04-20 PROCEDURE — 87636 SARSCOV2 & INF A&B AMP PRB: CPT

## 2025-04-20 PROCEDURE — 96374 THER/PROPH/DIAG INJ IV PUSH: CPT

## 2025-04-20 PROCEDURE — 2500000001 HC RX 250 WO HCPCS SELF ADMINISTERED DRUGS (ALT 637 FOR MEDICARE OP)

## 2025-04-20 RX ORDER — DOXYCYCLINE 100 MG/1
100 CAPSULE ORAL 2 TIMES DAILY
Qty: 13 CAPSULE | Refills: 0 | Status: SHIPPED | OUTPATIENT
Start: 2025-04-20 | End: 2025-04-27

## 2025-04-20 RX ORDER — DOXYCYCLINE 100 MG/1
100 CAPSULE ORAL ONCE
Status: COMPLETED | OUTPATIENT
Start: 2025-04-20 | End: 2025-04-20

## 2025-04-20 RX ORDER — PREDNISONE 20 MG/1
40 TABLET ORAL DAILY
Qty: 8 TABLET | Refills: 0 | Status: SHIPPED | OUTPATIENT
Start: 2025-04-20 | End: 2025-04-24

## 2025-04-20 RX ORDER — AMOXICILLIN AND CLAVULANATE POTASSIUM 875; 125 MG/1; MG/1
1 TABLET, FILM COATED ORAL EVERY 12 HOURS
Qty: 13 TABLET | Refills: 0 | Status: SHIPPED | OUTPATIENT
Start: 2025-04-20 | End: 2025-04-27

## 2025-04-20 RX ORDER — HYDROCODONE BITARTRATE AND ACETAMINOPHEN 5; 325 MG/1; MG/1
1 TABLET ORAL ONCE
Refills: 0 | Status: COMPLETED | OUTPATIENT
Start: 2025-04-20 | End: 2025-04-20

## 2025-04-20 RX ORDER — AMOXICILLIN AND CLAVULANATE POTASSIUM 875; 125 MG/1; MG/1
1 TABLET, FILM COATED ORAL ONCE
Status: COMPLETED | OUTPATIENT
Start: 2025-04-20 | End: 2025-04-20

## 2025-04-20 RX ORDER — IPRATROPIUM BROMIDE AND ALBUTEROL SULFATE 2.5; .5 MG/3ML; MG/3ML
3 SOLUTION RESPIRATORY (INHALATION) EVERY 20 MIN
Status: COMPLETED | OUTPATIENT
Start: 2025-04-20 | End: 2025-04-20

## 2025-04-20 RX ADMIN — IPRATROPIUM BROMIDE AND ALBUTEROL SULFATE 3 ML: 2.5; .5 SOLUTION RESPIRATORY (INHALATION) at 14:43

## 2025-04-20 RX ADMIN — DOXYCYCLINE HYCLATE 100 MG: 100 CAPSULE ORAL at 14:43

## 2025-04-20 RX ADMIN — IPRATROPIUM BROMIDE AND ALBUTEROL SULFATE 3 ML: 2.5; .5 SOLUTION RESPIRATORY (INHALATION) at 14:57

## 2025-04-20 RX ADMIN — METHYLPREDNISOLONE SODIUM SUCCINATE 125 MG: 125 INJECTION, POWDER, FOR SOLUTION INTRAMUSCULAR; INTRAVENOUS at 14:25

## 2025-04-20 RX ADMIN — HYDROCODONE BITARTRATE AND ACETAMINOPHEN 1 TABLET: 5; 325 TABLET ORAL at 14:24

## 2025-04-20 RX ADMIN — IOHEXOL 60 ML: 350 INJECTION, SOLUTION INTRAVENOUS at 15:15

## 2025-04-20 RX ADMIN — AMOXICILLIN AND CLAVULANATE POTASSIUM 1 TABLET: 875; 125 TABLET, FILM COATED ORAL at 16:10

## 2025-04-20 RX ADMIN — IPRATROPIUM BROMIDE AND ALBUTEROL SULFATE 3 ML: 2.5; .5 SOLUTION RESPIRATORY (INHALATION) at 14:26

## 2025-04-20 ASSESSMENT — COLUMBIA-SUICIDE SEVERITY RATING SCALE - C-SSRS
2. HAVE YOU ACTUALLY HAD ANY THOUGHTS OF KILLING YOURSELF?: NO
6. HAVE YOU EVER DONE ANYTHING, STARTED TO DO ANYTHING, OR PREPARED TO DO ANYTHING TO END YOUR LIFE?: NO
1. IN THE PAST MONTH, HAVE YOU WISHED YOU WERE DEAD OR WISHED YOU COULD GO TO SLEEP AND NOT WAKE UP?: NO

## 2025-04-20 ASSESSMENT — PAIN DESCRIPTION - ORIENTATION: ORIENTATION: RIGHT

## 2025-04-20 ASSESSMENT — PAIN SCALES - GENERAL
PAINLEVEL_OUTOF10: 2
PAINLEVEL_OUTOF10: 7

## 2025-04-20 ASSESSMENT — PAIN DESCRIPTION - LOCATION
LOCATION: BACK
LOCATION: RIB CAGE

## 2025-04-20 ASSESSMENT — PAIN - FUNCTIONAL ASSESSMENT
PAIN_FUNCTIONAL_ASSESSMENT: 0-10
PAIN_FUNCTIONAL_ASSESSMENT: 0-10

## 2025-04-20 ASSESSMENT — PAIN DESCRIPTION - PAIN TYPE
TYPE: ACUTE PAIN
TYPE: ACUTE PAIN

## 2025-04-20 NOTE — ED PROVIDER NOTES
History of Present Illness     History provided by: Patient  Limitations to History: None    HPI:  Marshall Silva is a 64 y.o. male with history of COPD not on home oxygen, current tobacco use, squamous cell lung cancer status post right upper lobectomy and chemo 2016 who presents for increased dark sputum production, worsening cough and shortness of breath, fatigue, for 1 week.  The patient has noticed an increase in fatigue over the past week especially at work. He does drive long distances for work frequently, just came back from a trip for work. This has been associated with increasing cough, sometimes leading to worsening shortness of breath and dry heaving but no actual emesis. Endorses some chills, night sweats, but no known fevers. No abdominal pain, diarrhea, dysuria although he does note decreased urinary frequency despite drinking lots of fluids. He has not been eating very much but has been hydrating with Pedialyte and water.  Patient does smoke 1 pack/day, decreased from 2 packs/day since early teenage years.  No history of DVT or PE, no lower extremity edema or pain. No current known active cancer although last PET scan was February of last year.  No history of heart failure, although the patient does endorse feeling slightly more short of breath while laying flat.    Physical Exam   Triage vitals:  T 36.6 °C (97.9 °F)  HR 91  /70  RR 20  O2 96 % None (Room air)    Physical Exam  Constitutional:       Appearance: He is not ill-appearing.   HENT:      Mouth/Throat:      Mouth: Mucous membranes are dry.   Cardiovascular:      Rate and Rhythm: Normal rate and regular rhythm.   Pulmonary:      Effort: Pulmonary effort is normal.      Comments: Diffuse biphasic wheezing without focal rhonchi, rales  Chest:      Chest wall: No tenderness.   Abdominal:      General: Abdomen is flat. There is no distension.      Palpations: Abdomen is soft.      Tenderness: There is no abdominal tenderness. There is  no guarding.   Musculoskeletal:      Cervical back: Neck supple.      Right lower leg: No edema.      Left lower leg: No edema.   Skin:     Capillary Refill: Capillary refill takes less than 2 seconds.   Neurological:      General: No focal deficit present.      Mental Status: He is alert.          Medical Decision Making & ED Course   Medical Decision Makin y.o. male with a history of squamous cell lung cancer status post chemo and right upper lobectomy, COPD coming in with increasing fatigue, shortness of breath, dyspnea after recent prolonged travel. Vitals are within normal limits and he is saturating at 96% on room air.  COVID and flu swab were negative, CBC shows leukocytosis to 14.1, CMP shows hyponatremia 133 and hypochloremia 94 but otherwise within normal limits.  Troponin and BNP are within normal limits, no acute changes on EKG which is normal sinus rhythm.  Chest x-ray shows extensive right sided pleural and parenchymal disease and CT PE was obtained given his risk factors and the extent of the pneumonia on our personal read of the x-ray. CT showed diffuse infiltrate in the right lower lobe and volume loss in the middle lobe consistent with pneumonia and no PE, likely reactive lymph node. His subjective shortness of breath did improve after DuoNebs and Solu-Medrol.  Patient's curb 65 score is 0 and although he was offered admission given the extent of his pneumonia, he declined preferring to be managed outpatient. His ambulatory pulse ox was 95%, and he tolerated his first doses of Augmentin and Doxy in the ER.  He was discharged with prednisone burst and instructions to increase his inhaled COPD regimen to treat COPD exacerbation, antibiotics for extensive pneumonia, plans to follow-up with PCP, and return precautions.  ----  Curb 65- 0    Differential diagnoses considered include but are not limited to: Pneumonia, PE, pneumo, rib fracture, costochondritis, ACS, effusion     Social Determinants  of Health which Significantly Impact Care: None identified     EKG Independent Interpretation: EKG interpreted by myself. Please see ED Course for full interpretation.    Independent Result Review and Interpretation: Relevant laboratory and radiographic results were reviewed and independently interpreted by myself.  As necessary, they are commented on in the ED Course.    Chronic conditions affecting the patient's care: As documented above in [facility]    The patient was discussed with the following consultants/services: None    Care Considerations: As documented above in [facility]    ED Course:  ED Course as of 04/20/25 1631   Sun Apr 20, 2025   1420 Bilateral biphasic wheezing [JH]   1529 ECG 12 lead  EKG shows NSR rate 85, , QRS 78, Qtc 430, non-ischemic [JH]   1556 Patient signed out to me at this time.  Prior history of lung cancer status post lobectomy.  CT is showing pneumonia.  Already covered with doxycycline.  Will add on Augmentin. Has a COPD exacerbation per sign out. Prior ED team was already recommended offered admission however patient is deferring this wishing to go home.  Will wait on formal radiology read.  Patient having no respiratory distress and saturating well on room air at this time. [TL]   1605 IMPRESSION:  1.  No pulmonary embolism  2. Status post right upper lobectomy  3. Diffuse infiltrate in the right lower lobe is consistent with  pneumonia. Slight volume loss is present in the right middle lobe  which could be pneumonia related also.  4. Slight increase in size of the subcarinal lymph node is probably  reactive, with short axis diameter still less than 1 cm   [TL]      ED Course User Index  [JH] Ron Spears MD  [TL] Nuno Lobo DO         Diagnoses as of 04/20/25 1631   Pneumonia of right lower lobe due to infectious organism   COPD exacerbation (Multi)     Disposition   Patient was discharged home in stable condition with follow-up instructions with the, steroid burst, antibiotics.   We discussed return precautions and patient expresses understanding.    Procedures   Procedures    Patient seen and discussed with ED attending physician.    Gretchen Glaser DO  Family Medicine PGY-2       Gretchen Glaser DO  Resident  04/20/25 1634       Ron Spears MD  04/22/25 0795

## 2025-04-20 NOTE — DISCHARGE INSTRUCTIONS
Thank you for coming to emergency room.  You were seen for shortness of breath and fatigue.  You are being treated for pneumonia on the right side.  You received a breathing treatment, IV steroids, and your first dose of antibiotics here.  Please start taking prednisone tomorrow morning, and take Augmentin and doxycycline both twice a day for 7 days.  Please use your breathing treatments every 4 hours for the next 48 hours to help with your COPD exacerbation.  Please follow-up with your PCP within the next week or sooner if you are not feeling better.  Come back to the emergency room if you are having difficulty breathing, feel more weak, or develop any new concerning symptoms.  Feel better!

## 2025-04-23 ENCOUNTER — TELEPHONE (OUTPATIENT)
Dept: PRIMARY CARE | Facility: CLINIC | Age: 65
End: 2025-04-23
Payer: COMMERCIAL

## 2025-04-23 NOTE — TELEPHONE ENCOUNTER
Pt still  has blood in his mucas.  Was in ED for pneumonia 4-20-25 and should he be concerned.  Please advise.   [phone]

## 2025-04-25 ENCOUNTER — HOSPITAL ENCOUNTER (EMERGENCY)
Facility: HOSPITAL | Age: 65
Discharge: HOME | End: 2025-04-25
Attending: EMERGENCY MEDICINE
Payer: COMMERCIAL

## 2025-04-25 ENCOUNTER — APPOINTMENT (OUTPATIENT)
Dept: CARDIOLOGY | Facility: HOSPITAL | Age: 65
End: 2025-04-25
Payer: COMMERCIAL

## 2025-04-25 ENCOUNTER — APPOINTMENT (OUTPATIENT)
Dept: RADIOLOGY | Facility: HOSPITAL | Age: 65
End: 2025-04-25
Payer: COMMERCIAL

## 2025-04-25 VITALS
SYSTOLIC BLOOD PRESSURE: 111 MMHG | HEART RATE: 76 BPM | HEIGHT: 72 IN | DIASTOLIC BLOOD PRESSURE: 66 MMHG | TEMPERATURE: 98.2 F | RESPIRATION RATE: 18 BRPM | BODY MASS INDEX: 18.01 KG/M2 | WEIGHT: 133 LBS | OXYGEN SATURATION: 96 %

## 2025-04-25 DIAGNOSIS — R04.2 HEMOPTYSIS: Primary | ICD-10-CM

## 2025-04-25 LAB
ABO GROUP (TYPE) IN BLOOD: NORMAL
ALBUMIN SERPL BCP-MCNC: 3.8 G/DL (ref 3.4–5)
ALP SERPL-CCNC: 54 U/L (ref 33–136)
ALT SERPL W P-5'-P-CCNC: 14 U/L (ref 10–52)
ANION GAP SERPL CALC-SCNC: 12 MMOL/L
ANTIBODY SCREEN: NORMAL
AST SERPL W P-5'-P-CCNC: 13 U/L (ref 9–39)
ATRIAL RATE: 70 BPM
BASOPHILS # BLD AUTO: 0.03 X10*3/UL (ref 0–0.1)
BASOPHILS NFR BLD AUTO: 0.3 %
BILIRUB SERPL-MCNC: 0.3 MG/DL (ref 0–1.2)
BUN SERPL-MCNC: 14 MG/DL (ref 6–23)
CALCIUM SERPL-MCNC: 9.1 MG/DL (ref 8.6–10.3)
CHLORIDE SERPL-SCNC: 94 MMOL/L (ref 98–107)
CO2 SERPL-SCNC: 30 MMOL/L (ref 21–32)
CREAT SERPL-MCNC: 0.61 MG/DL (ref 0.5–1.3)
EGFRCR SERPLBLD CKD-EPI 2021: >90 ML/MIN/1.73M*2
EOSINOPHIL # BLD AUTO: 0.02 X10*3/UL (ref 0–0.7)
EOSINOPHIL NFR BLD AUTO: 0.2 %
ERYTHROCYTE [DISTWIDTH] IN BLOOD BY AUTOMATED COUNT: 13.4 % (ref 11.5–14.5)
GLUCOSE SERPL-MCNC: 109 MG/DL (ref 74–99)
HCT VFR BLD AUTO: 41 % (ref 41–52)
HGB BLD-MCNC: 13.4 G/DL (ref 13.5–17.5)
IMM GRANULOCYTES # BLD AUTO: 0.2 X10*3/UL (ref 0–0.7)
IMM GRANULOCYTES NFR BLD AUTO: 1.7 % (ref 0–0.9)
INR PPP: 1 (ref 0.9–1.1)
LYMPHOCYTES # BLD AUTO: 0.64 X10*3/UL (ref 1.2–4.8)
LYMPHOCYTES NFR BLD AUTO: 5.5 %
MCH RBC QN AUTO: 33 PG (ref 26–34)
MCHC RBC AUTO-ENTMCNC: 32.7 G/DL (ref 32–36)
MCV RBC AUTO: 101 FL (ref 80–100)
MONOCYTES # BLD AUTO: 0.27 X10*3/UL (ref 0.1–1)
MONOCYTES NFR BLD AUTO: 2.3 %
NEUTROPHILS # BLD AUTO: 10.41 X10*3/UL (ref 1.2–7.7)
NEUTROPHILS NFR BLD AUTO: 90 %
NRBC BLD-RTO: 0 /100 WBCS (ref 0–0)
P AXIS: 72 DEGREES
P OFFSET: 190 MS
P ONSET: 143 MS
PLATELET # BLD AUTO: 519 X10*3/UL (ref 150–450)
POTASSIUM SERPL-SCNC: 4.4 MMOL/L (ref 3.5–5.3)
PR INTERVAL: 160 MS
PROT SERPL-MCNC: 7.6 G/DL (ref 6.4–8.2)
PROTHROMBIN TIME: 11.3 SECONDS (ref 9.8–12.4)
Q ONSET: 223 MS
QRS COUNT: 12 BEATS
QRS DURATION: 78 MS
QT INTERVAL: 398 MS
QTC CALCULATION(BAZETT): 429 MS
QTC FREDERICIA: 419 MS
R AXIS: 81 DEGREES
RBC # BLD AUTO: 4.06 X10*6/UL (ref 4.5–5.9)
RH FACTOR (ANTIGEN D): NORMAL
SODIUM SERPL-SCNC: 132 MMOL/L (ref 136–145)
T AXIS: 77 DEGREES
T OFFSET: 422 MS
VENTRICULAR RATE: 70 BPM
WBC # BLD AUTO: 11.6 X10*3/UL (ref 4.4–11.3)

## 2025-04-25 PROCEDURE — 86901 BLOOD TYPING SEROLOGIC RH(D): CPT

## 2025-04-25 PROCEDURE — 71045 X-RAY EXAM CHEST 1 VIEW: CPT

## 2025-04-25 PROCEDURE — 93005 ELECTROCARDIOGRAM TRACING: CPT

## 2025-04-25 PROCEDURE — 99285 EMERGENCY DEPT VISIT HI MDM: CPT | Mod: 25 | Performed by: EMERGENCY MEDICINE

## 2025-04-25 PROCEDURE — 85025 COMPLETE CBC W/AUTO DIFF WBC: CPT | Performed by: EMERGENCY MEDICINE

## 2025-04-25 PROCEDURE — 2500000004 HC RX 250 GENERAL PHARMACY W/ HCPCS (ALT 636 FOR OP/ED): Mod: JZ

## 2025-04-25 PROCEDURE — 96360 HYDRATION IV INFUSION INIT: CPT

## 2025-04-25 PROCEDURE — 84075 ASSAY ALKALINE PHOSPHATASE: CPT | Performed by: EMERGENCY MEDICINE

## 2025-04-25 PROCEDURE — 96361 HYDRATE IV INFUSION ADD-ON: CPT

## 2025-04-25 PROCEDURE — 36415 COLL VENOUS BLD VENIPUNCTURE: CPT | Performed by: EMERGENCY MEDICINE

## 2025-04-25 PROCEDURE — 99285 EMERGENCY DEPT VISIT HI MDM: CPT | Performed by: EMERGENCY MEDICINE

## 2025-04-25 PROCEDURE — 85610 PROTHROMBIN TIME: CPT

## 2025-04-25 PROCEDURE — 71045 X-RAY EXAM CHEST 1 VIEW: CPT | Performed by: RADIOLOGY

## 2025-04-25 RX ADMIN — SODIUM CHLORIDE, SODIUM LACTATE, POTASSIUM CHLORIDE, AND CALCIUM CHLORIDE 1000 ML: .6; .31; .03; .02 INJECTION, SOLUTION INTRAVENOUS at 13:46

## 2025-04-25 ASSESSMENT — LIFESTYLE VARIABLES
EVER FELT BAD OR GUILTY ABOUT YOUR DRINKING: NO
TOTAL SCORE: 0
EVER HAD A DRINK FIRST THING IN THE MORNING TO STEADY YOUR NERVES TO GET RID OF A HANGOVER: NO
HAVE PEOPLE ANNOYED YOU BY CRITICIZING YOUR DRINKING: NO
HAVE YOU EVER FELT YOU SHOULD CUT DOWN ON YOUR DRINKING: NO

## 2025-04-25 ASSESSMENT — COLUMBIA-SUICIDE SEVERITY RATING SCALE - C-SSRS
6. HAVE YOU EVER DONE ANYTHING, STARTED TO DO ANYTHING, OR PREPARED TO DO ANYTHING TO END YOUR LIFE?: NO
1. IN THE PAST MONTH, HAVE YOU WISHED YOU WERE DEAD OR WISHED YOU COULD GO TO SLEEP AND NOT WAKE UP?: NO
2. HAVE YOU ACTUALLY HAD ANY THOUGHTS OF KILLING YOURSELF?: NO

## 2025-04-25 ASSESSMENT — PAIN DESCRIPTION - LOCATION: LOCATION: RIB CAGE

## 2025-04-25 ASSESSMENT — PAIN SCALES - GENERAL: PAINLEVEL_OUTOF10: 1

## 2025-04-25 ASSESSMENT — PAIN DESCRIPTION - ORIENTATION: ORIENTATION: RIGHT

## 2025-04-25 ASSESSMENT — PAIN - FUNCTIONAL ASSESSMENT: PAIN_FUNCTIONAL_ASSESSMENT: 0-10

## 2025-04-25 NOTE — DISCHARGE INSTRUCTIONS
You were evaluated in the Emergency Department today for coughing up blood.  This is most likely related to irritation of the tissues of your throat. Your work-up today did not reveal any acute, emergent findings requiring intervention that would be a cause of your symptoms. We do recommend strongly that you follow up with your primary care provider as soon as you are able to for further evaluation as outpatient.     If you have a return or worsening of symptoms including increased blood in your sputum, chest pain, shortness of breath, lightheadedness, dizziness, sweating, chest pain at rest, worsening exercise tolerance, or any new or concerning symptoms please seek immediate medical attention or come back to the ER.

## 2025-04-25 NOTE — ED PROVIDER NOTES
EMERGENCY DEPARTMENT ENCOUNTER      Pt Name: Marshall Silva  MRN: [id number]  Birthdate 1960  Date of evaluation: 4/25/2025  Provider: Terry Oreilly DO    CHIEF COMPLAINT       Chief Complaint   Patient presents with    Coughing Up Blood     Seen Sunday in ED for PNA; coughing up blood Wednesday morning         HISTORY OF PRESENT ILLNESS    Patient is a 64-year-old male with past medical history significant for lung cancer in remission, current tobacco use, COPD without any home oxygen, recently evaluated and diagnosed with a right sided pneumonia and has been taking antibiotics and steroids outpatient for treatment of this, presenting today with a chief complaint of hemoptysis.  This started earlier today, he has had streaks of blood in his mucus.  He does have a picture of this from home that does show streaks of blood, no blood clots noted.  Denies any difficulty breathing.  He states that his symptoms that he was having when he was diagnosed with pneumonia have been improving over the past week.          Nursing Notes were reviewed.    PAST MEDICAL HISTORY   Medical History[1]      SURGICAL HISTORY     Surgical History[2]      CURRENT MEDICATIONS       Previous Medications    ALBUTEROL (VENTOLIN HFA) 90 MCG/ACTUATION INHALER    Inhale 2 puffs every 4 hours if needed for wheezing or shortness of breath.    AMOXICILLIN-CLAVULANATE (AUGMENTIN) 875-125 MG TABLET    Take 1 tablet by mouth every 12 hours for 7 days.    DOXYCYCLINE (VIBRAMYCIN) 100 MG CAPSULE    Take 1 capsule (100 mg) by mouth 2 times a day for 7 days. Take with at least 8 ounces (large glass) of water, do not lie down for 30 minutes after    FLUTICASONE (FLONASE) 50 MCG/ACTUATION NASAL SPRAY    Administer 2 sprays into each nostril once daily. Shake gently. Before first use, prime pump. After use, clean tip and replace cap.    LORATADINE (CLARITIN) 10 MG TABLET    Take 1 tablet (10 mg) by mouth once daily.    OMEPRAZOLE (PRILOSEC) 20 MG   CAPSULE    Take 1 capsule (20 mg) by mouth once daily in the morning. Take before meals.    SUMATRIPTAN (IMITREX) 50 MG TABLET    Take 1 tablet (50 mg) by mouth 1 time if needed for migraine. May repeat after 2 hours.    TIOTROPIUM-OLODATEROL (STIOLTO RESPIMAT) 2.5-2.5 MCG/ACTUATION MIST INHALER    Inhale 2 Inhalations once daily.    TIZANIDINE (ZANAFLEX) 4 MG TABLET    Take 1 tablet (4 mg) by mouth every 6 hours if needed for muscle spasms for up to 10 days.       ALLERGIES     Patient has no known allergies.    FAMILY HISTORY     Family History[3]       SOCIAL HISTORY     Social History[4]    SCREENINGS                        PHYSICAL EXAM    (up to 7 for level 4, 8 or more for level 5)     ED Triage Vitals [04/25/25 1234]   Temperature Heart Rate Respirations BP   36.8 °C (98.2 °F) 86 18 122/69      Pulse Ox Temp src Heart Rate Source Patient Position   99 % -- -- --      BP Location FiO2 (%)     -- --       Physical Exam  Vitals and nursing note reviewed.   Constitutional:       General: He is not in acute distress.     Appearance: Normal appearance. He is not ill-appearing or toxic-appearing.   HENT:      Head: Normocephalic and atraumatic.      Right Ear: External ear normal.      Left Ear: External ear normal.      Nose: Nose normal.   Eyes:      General:         Right eye: No discharge.         Left eye: No discharge.      Extraocular Movements: Extraocular movements intact.      Conjunctiva/sclera: Conjunctivae normal.      Pupils: Pupils are equal, round, and reactive to light.   Cardiovascular:      Rate and Rhythm: Normal rate and regular rhythm.      Pulses: Normal pulses.      Heart sounds: Normal heart sounds.   Pulmonary:      Effort: Pulmonary effort is normal. No tachypnea, bradypnea, accessory muscle usage or respiratory distress.      Breath sounds: Wheezing present.   Abdominal:      General: There is no distension.      Palpations: Abdomen is soft. There is no mass.      Tenderness: There is no  abdominal tenderness. There is no guarding.   Musculoskeletal:         General: No deformity or signs of injury.   Skin:     General: Skin is warm and dry.   Neurological:      General: No focal deficit present.      Mental Status: He is alert and oriented to person, place, and time. Mental status is at baseline.   Psychiatric:         Mood and Affect: Mood normal.         Behavior: Behavior normal.          DIAGNOSTIC RESULTS     LABS:  Labs Reviewed   COMPREHENSIVE METABOLIC PANEL - Abnormal       Result Value    Glucose 109 (*)     Sodium 132 (*)     Potassium 4.4      Chloride 94 (*)     Bicarbonate        Anion Gap        Urea Nitrogen 14      Creatinine 0.61      eGFR >90      Calcium 9.1      Albumin 3.8      Alkaline Phosphatase 54      Total Protein 7.6      AST 13      Bilirubin, Total 0.3      ALT 14     CBC WITH AUTO DIFFERENTIAL - Abnormal    WBC 11.6 (*)     nRBC 0.0      RBC 4.06 (*)     Hemoglobin 13.4 (*)     Hematocrit 41.0       (*)     MCH 33.0      MCHC 32.7      RDW 13.4      Platelets 519 (*)     Neutrophils % 90.0      Immature Granulocytes %, Automated 1.7 (*)     Lymphocytes % 5.5      Monocytes % 2.3      Eosinophils % 0.2      Basophils % 0.3      Neutrophils Absolute 10.41 (*)     Immature Granulocytes Absolute, Automated 0.20      Lymphocytes Absolute 0.64 (*)     Monocytes Absolute 0.27      Eosinophils Absolute 0.02      Basophils Absolute 0.03     PROTIME-INR - Normal    Protime 11.3      INR 1.0     TYPE AND SCREEN    ABO TYPE A      Rh TYPE POS      ANTIBODY SCREEN NEG         All other labs were within normal range or not returned as of this dictation.    Imaging  XR chest 1 view   Final Result   Extensive pleural and parenchymal abnormalities of the right lung,   stable to minimally improved from prior. Continued follow-up   recommended to confirm complete resolution of the abnormalities and   to rule out underlying neoplasm.        Signed by: Dillon Arredondo 4/25/2025 1:38 PM    Dictation workstation:   [id number]           Procedures  Procedures     EMERGENCY DEPARTMENT COURSE/MDM:   Medical Decision Making  64-year-old male with past medical history significant for lung cancer in remission, COPD, presenting with a chief complaint of hemoptysis.  He has been take antibiotics outpatient as well as prednisone for treatment of pneumonia for the past 5 days.  Has had a small amount of hemoptysis with streaks of blood in his mucus for the past day or so.  No other symptoms.  No chest pain, his breathing is improving in regard to the pneumonia that he was diagnosed with 5 days ago.  Vitally stable.  Exam notable for wheezing bilaterally, patient is otherwise not in any respiratory distress and feels that his breathing is improving close to his baseline.  Saturating 99% on room air.  Afebrile.  High suspicion that his hemoptysis is related to airway irritation from significant coughing given his history of pneumonia, as well as given that he has a picture that has mucus with blood tinge in it and there is no evidence of large blood clot and he is hemodynamically stable otherwise.  Had a CT PE study done at his previous visit that did not not show any evidence of PE.  Will obtain basic labs to evaluate his hemoglobin and coags.  If there is no significant drop compared to previous, this will further support our most likely diagnosis.  Close obtain a chest x-ray to evaluate for clinical treatment of his pneumonia and to determine if his pneumonia has improved versus worsened and could potentially be the source of his hemoptysis.        Please see ED course for additional MDM.    ED Course as of 04/25/25 1439   Fri Apr 25, 2025   4247 64-year-old male presenting to the emergency department for hemoptysis.  He has been having blood-streaked in the sputum for the past 3 days.  He states that this has been improving.  He was seen here 5 days ago diagnosed with pneumonia at that time and started on  oral antibiotics.  He states that his breathing has been improving.  He has been feeling a little lightheaded over the last couple of days as well.  He denies any syncope.  He had a CTA performed when he was here on Sunday that showed his pneumonia but did not reveal any evidence of pulmonary embolism.    Given that he just had a CTA performed I do not think that we need to repeat the CT.  I think his hemoptysis is more consistent with his pneumonia.  His breathing and hemoptysis is improving as the pneumonia is getting treated.  IV established and labs performed.  Chest x-ray does show stable to slightly improving infiltrate. [JJ]   1355 Chest x-ray with significantly less right-sided infiltrate compared to previous x-ray taken on 4/20/2025. [CL]   1403 EKG taken at 1401 on 4/25/2025 independently interpreted by the resident physician, sinus rhythm rate of 78 [place] interval 160, QTc 429, normal axis, no acute ischemic changes. [CL]   1419 CBC with an overall improvement in white count compared to 5 days ago.  Hemoglobin stable at 13.4. [CL]   1437 CMP overall unremarkable aside from a mild hyponatremia of 132, likely secondary to dehydration.  Patient feeling at baseline, and is comfortable with plan for discharge home.  Return precautions discussed.  Discharged in stable condition. [CL]      ED Course User Index  [CL] Terry Oreilly DO  [JJ] Leonardo Bryant DO         Diagnoses as of 04/25/25 1439   Hemoptysis        XR chest 1 view   Final Result   Extensive pleural and parenchymal abnormalities of the right lung,   stable to minimally improved from prior. Continued follow-up   recommended to confirm complete resolution of the abnormalities and   to rule out underlying neoplasm.        Signed by: Dillon Arredondo 4/25/2025 1:38 PM   Dictation workstation:   [id number]          Patient and or family in agreement and understanding of treatment plan.  All questions answered.      I reviewed the case with the attending ED  physician. The attending ED physician agrees with the plan. Patient and/or patient´s representative was counseled regarding labs, imaging, likely diagnosis, and plan. All questions were answered.    ED Medications administered this visit:    Medications   lactated Ringer's bolus 1,000 mL (1,000 mL intravenous New Bag 4/25/25 1346)       New Prescriptions from this visit:    New Prescriptions    No medications on file       Follow-up:  No follow-up provider specified.      Final Impression:   1. Hemoptysis          (Please note that portions of this note were completed with a voice recognition program.  Efforts were made to edit the dictations but occasionally words are mis-transcribed.)       [1]   Past Medical History:  Diagnosis Date    Personal history of other diseases of the respiratory system 01/08/2021    History of chronic obstructive lung disease   [2]   Past Surgical History:  Procedure Laterality Date    OTHER SURGICAL HISTORY  01/08/2021    Lung lobectomy   [3]   Family History  Problem Relation Name Age of Onset    Cancer Father      Heart attack Other Grandmother    [4]   Social History  Socioeconomic History    Marital status:    Tobacco Use    Smoking status: Every Day     Types: Cigarettes   Vaping Use    Vaping status: Never Used   Substance and Sexual Activity    Alcohol use: Yes     Alcohol/week: 2.0 standard drinks of alcohol     Types: 2 Cans of beer per week    Drug use: Never     Social Drivers of Health     Financial Resource Strain: Low Risk  (1/23/2024)    Overall Financial Resource Strain (CARDIA)     Difficulty of Paying Living Expenses: Not hard at all   Food Insecurity: No Food Insecurity (1/23/2024)    Hunger Vital Sign     Worried About Running Out of Food in the Last Year: Never true     Ran Out of Food in the Last Year: Never true   Transportation Needs: No Transportation Needs (1/23/2024)    PRAPARE - Transportation     Lack of Transportation (Medical): No     Lack of  Transportation (Non-Medical): No   Physical Activity: Sufficiently Active (1/23/2024)    Exercise Vital Sign     Days of Exercise per Week: 7 days     Minutes of Exercise per Session: 60 min   Stress: No Stress Concern Present (1/23/2024)    [employer] [place] [employer] - Occupational Stress Questionnaire     Feeling of Stress : Not at all   Social Connections: Socially Isolated (1/23/2024)    Social Connection and Isolation Panel [NHANES]     Frequency of Communication with Friends and Family: More than three times a week     Attends Caodaism Services: Never     Active Member of Clubs or Organizations: No     Attends Club or Organization Meetings: Never     Marital Status:    Intimate Partner Violence: Not At Risk (1/23/2024)    Humiliation, Afraid, Rape, and Kick questionnaire     Fear of Current or Ex-Partner: No     Emotionally Abused: No     Physically Abused: No     Sexually Abused: No   Housing Stability: Unknown (1/23/2024)    Housing Stability Vital Sign     Unable to Pay for Housing in the Last Year: No     Unstable Housing in the Last Year: No        Terry Oreilly DO  Resident  04/25/25 [id number]

## 2025-04-28 ENCOUNTER — APPOINTMENT (OUTPATIENT)
Dept: PRIMARY CARE | Facility: CLINIC | Age: 65
End: 2025-04-28
Payer: COMMERCIAL

## 2025-04-28 ENCOUNTER — HOSPITAL ENCOUNTER (OUTPATIENT)
Dept: RADIOLOGY | Facility: CLINIC | Age: 65
Discharge: HOME | End: 2025-04-28
Payer: COMMERCIAL

## 2025-04-28 VITALS
HEIGHT: 72 IN | HEART RATE: 84 BPM | BODY MASS INDEX: 17.74 KG/M2 | RESPIRATION RATE: 16 BRPM | SYSTOLIC BLOOD PRESSURE: 118 MMHG | WEIGHT: 131 LBS | TEMPERATURE: 97.9 F | DIASTOLIC BLOOD PRESSURE: 70 MMHG | OXYGEN SATURATION: 97 %

## 2025-04-28 DIAGNOSIS — J18.9 PNEUMONIA OF RIGHT MIDDLE LOBE DUE TO INFECTIOUS ORGANISM: Primary | ICD-10-CM

## 2025-04-28 DIAGNOSIS — Z00.00 WELL ADULT EXAM: ICD-10-CM

## 2025-04-28 DIAGNOSIS — Z85.118 HISTORY OF LUNG CANCER: ICD-10-CM

## 2025-04-28 DIAGNOSIS — G95.9 CERVICAL MYELOPATHY: ICD-10-CM

## 2025-04-28 DIAGNOSIS — J44.9 CHRONIC OBSTRUCTIVE PULMONARY DISEASE, UNSPECIFIED COPD TYPE (MULTI): ICD-10-CM

## 2025-04-28 DIAGNOSIS — R91.8 ABNORMAL FINDING ON LUNG IMAGING: ICD-10-CM

## 2025-04-28 DIAGNOSIS — J18.9 PNEUMONIA OF RIGHT MIDDLE LOBE DUE TO INFECTIOUS ORGANISM: ICD-10-CM

## 2025-04-28 PROCEDURE — 99214 OFFICE O/P EST MOD 30 MIN: CPT

## 2025-04-28 PROCEDURE — 71046 X-RAY EXAM CHEST 2 VIEWS: CPT

## 2025-04-28 PROCEDURE — 3008F BODY MASS INDEX DOCD: CPT

## 2025-04-28 PROCEDURE — 71046 X-RAY EXAM CHEST 2 VIEWS: CPT | Performed by: RADIOLOGY

## 2025-04-28 RX ORDER — METHOCARBAMOL 500 MG/1
500 TABLET, FILM COATED ORAL 2 TIMES DAILY PRN
Qty: 30 TABLET | Refills: 0 | Status: SHIPPED | OUTPATIENT
Start: 2025-04-28 | End: 2025-05-28

## 2025-04-28 RX ORDER — ALBUTEROL SULFATE 90 UG/1
2 INHALANT RESPIRATORY (INHALATION) EVERY 4 HOURS PRN
Qty: 18 G | Refills: 5 | Status: SHIPPED | OUTPATIENT
Start: 2025-04-28 | End: 2026-04-28

## 2025-04-28 RX ORDER — LORATADINE 10 MG/1
10 TABLET ORAL DAILY
Qty: 30 TABLET | Refills: 11 | Status: SHIPPED | OUTPATIENT
Start: 2025-04-28 | End: 2026-04-28

## 2025-04-28 RX ORDER — OMEPRAZOLE 20 MG/1
20 CAPSULE, DELAYED RELEASE ORAL
Qty: 90 CAPSULE | Refills: 0 | Status: SHIPPED | OUTPATIENT
Start: 2025-04-28 | End: 2025-07-27

## 2025-04-28 NOTE — PROGRESS NOTES
Subjective   Marshall Silva is a 64 y.o. male who presents for Hospital Follow-up.     Patient here to follow up for PNA. He presented ED on 4/20/25 and 4/25/25 and work up included labwork, EKG, and imaging. Work up reviewed findings suggestive of right middle low PNA. He was treated with 7 day course of doxycycline and augmentin. He was also treated with burst of prednisone. At time of presentation to ED he was having hemoptysis, which is why he returned to ED on 4/25. He was ultimately deemed stable enough for discharge to home with close outpatient follow up.     He has been doing better since going to ED on 4/25. Has not had any further hemoptysis. He does still endorse right chest wall discomfort that began with start of PNA and has steadily been improving. Cough is resolved. Breathing is back to baseline. Feels like he has not been drinking enough water.     Visit Vitals  /70 (BP Location: Left arm, Patient Position: Sitting, BP Cuff Size: Adult)   Pulse 84   Temp 36.6 °C (97.9 °F)   Resp 16      Objective   Physical Exam  Vitals reviewed.   Constitutional:       General: He is not in acute distress.     Appearance: Normal appearance. He is not toxic-appearing.   HENT:      Head: Normocephalic and atraumatic.      Nose: Nose normal.   Eyes:      Extraocular Movements: Extraocular movements intact.   Cardiovascular:      Rate and Rhythm: Normal rate and regular rhythm.      Heart sounds: No murmur heard.     No friction rub. No gallop.   Pulmonary:      Effort: Pulmonary effort is normal. No respiratory distress.      Breath sounds: Wheezing present.   Skin:     General: Skin is warm and dry.   Neurological:      General: No focal deficit present.      Mental Status: He is alert.   Psychiatric:         Mood and Affect: Mood normal.         Behavior: Behavior normal.            Assessment/Plan      Assessment & Plan  Pneumonia of right middle lobe due to infectious organism  Patient here for emergency  department follow-up.  He was diagnosed with pneumonia and treated with course of Augmentin, doxycycline, prednisone.  He says that he has noticed significant interval improvement in his symptoms and feels like his breathing is better than ever.  Most recent chest x-ray showed improvement, but not resolution of pneumonia.  Will repeat chest x-ray to track his recovery.  On examination he is vitally stable and saturating 97% on room air.  He does have some wheezes throughout the lung fields, but these are present and attributable to his COPD.  Orders:    XR chest 2 views; Future    Chronic obstructive pulmonary disease, unspecified COPD type (Multi)  Refills sent for patient's inhalers.  Recommend patient follow-up with his pulmonologist Dr. Vega who he has not seen in approximately 1 year.  Patient was agreeable and will set an appointment soon as possible.  Orders:    loratadine (Claritin) 10 mg tablet; Take 1 tablet (10 mg) by mouth once daily.    tiotropium-olodateroL (Stiolto Respimat) 2.5-2.5 mcg/actuation mist inhaler; Inhale 2 Inhalations once daily.    Referral to Pulmonology; Future    History of lung cancer  Patient has history of lung cancer status post partial lobectomy.  He does follow-up with pulmonologist and will be scheduling for follow-up.  She will also be coming in for annual wellness visit shortly.  Orders:    albuterol (Ventolin HFA) 90 mcg/actuation inhaler; Inhale 2 puffs every 4 hours if needed for wheezing or shortness of breath.    Referral to Pulmonology; Future    Abnormal finding on lung imaging    Orders:    albuterol (Ventolin HFA) 90 mcg/actuation inhaler; Inhale 2 puffs every 4 hours if needed for wheezing or shortness of breath.    Cervical myelopathy  Patient has diagnosed history of cervical myelopathy and previously has benefited from intermittent use of as needed muscle relaxers for the pain.  He also works a very physical job and has generalized muscle aches and pains.  He  "is requesting refill for muscle relaxant.  Will change to Robaxin as this is less sedating.  Orders:    omeprazole (PriLOSEC) 20 mg DR capsule; Take 1 capsule (20 mg) by mouth once daily in the morning. Take before meals.    methocarbamol (Robaxin) 500 mg tablet; Take 1 tablet (500 mg) by mouth 2 times a day as needed for muscle spasms.    Well adult exam  Patient has to follow-up in short order for annual wellness visit.  Orders:    Follow Up In [facility] - PCP; Future           This note was partially created using voice recognition software and is inherently subject to errors including those of syntax and \"sound-alike\" substitutions which may escape proofreading. In such instances, original meaning may be extrapolated by contextual derivation.      "

## 2025-04-28 NOTE — PROGRESS NOTES
I reviewed the resident/fellow's documentation and discussed the patient with the resident. I agree with the resident medical decision making as documented in the note.   Patient appears to be recovering.  He has been getting better.  Will check the chest x-ray.  Patient will go to the ER if he starts have any fever chills any chest pain shortness breath any nausea vomiting or fever headache any worsening symptoms  Follow-up in 1 month  Agree with assessment and plan  Dillon Mayorga, DO

## 2025-04-28 NOTE — ASSESSMENT & PLAN NOTE
Patient has history of lung cancer status post partial lobectomy.  He does follow-up with pulmonologist and will be scheduling for follow-up.  She will also be coming in for annual wellness visit shortly.  Orders:    albuterol (Ventolin HFA) 90 mcg/actuation inhaler; Inhale 2 puffs every 4 hours if needed for wheezing or shortness of breath.    Referral to Pulmonology; Future

## 2025-04-30 NOTE — RESULT ENCOUNTER NOTE
Patient's x-ray still shows send on changed pneumonia pattern.  Symptom wise he was doing better.  Can we check and see if we need to send in a different antibiotic.

## 2025-05-01 NOTE — RESULT ENCOUNTER NOTE
Discussed with patient results of chest x-ray.  X-ray is still showing signs of pneumonia.  These changes can take time to resolve after clinical improvement is noted.  Patient says that he is feeling much better than when he was evaluated in office.  Return precautions were reviewed.

## 2025-05-02 ENCOUNTER — OFFICE VISIT (OUTPATIENT)
Dept: PULMONOLOGY | Facility: CLINIC | Age: 65
End: 2025-05-02
Payer: COMMERCIAL

## 2025-05-02 VITALS
OXYGEN SATURATION: 97 % | WEIGHT: 133.2 LBS | TEMPERATURE: 98.4 F | HEART RATE: 84 BPM | HEIGHT: 72 IN | DIASTOLIC BLOOD PRESSURE: 72 MMHG | SYSTOLIC BLOOD PRESSURE: 129 MMHG | BODY MASS INDEX: 18.04 KG/M2

## 2025-05-02 DIAGNOSIS — Z85.118 HISTORY OF LUNG CANCER: ICD-10-CM

## 2025-05-02 DIAGNOSIS — J44.9 CHRONIC OBSTRUCTIVE PULMONARY DISEASE, UNSPECIFIED COPD TYPE (MULTI): ICD-10-CM

## 2025-05-02 PROCEDURE — 99204 OFFICE O/P NEW MOD 45 MIN: CPT | Performed by: INTERNAL MEDICINE

## 2025-05-02 PROCEDURE — 3008F BODY MASS INDEX DOCD: CPT | Performed by: INTERNAL MEDICINE

## 2025-05-02 ASSESSMENT — COPD QUESTIONNAIRES
QUESTION2_CHESTPHLEGM: 2
CAT_TOTALSCORE: 24
QUESTION7_SLEEPQUALITY: 5 - I DON'T SLEEP SOUNDLY BECAUSE OF MY LUNG CONDITION
QUESTION5_HOMEACTIVITIES: 3
QUESTION6_LEAVINGHOUSE: 1
QUESTION4_WALKINCLINE: 4
QUESTION8_ENERGYLEVEL: 4
QUESTION1_COUGHFREQUENCY: 3
QUESTION3_CHESTTIGHTNESS: 2

## 2025-05-02 NOTE — PROGRESS NOTES
Subjective   Patient ID: Marshall Silva is a 64 y.o. male who presents for Wheezing (Pneumonia follow up).  HPI  This is a 64-year-old  male that had a past history of lung cancer in 2016.  He admits to phlegm production in the morning and evening.  He also in the past underwent a right upper lobe lobectomy.  His oxygen saturation on room air at rest today was 97% I reviewed his chest x-ray from 4/20/25 and 4/25/25 and 4/28/25 which shows continued improvement over those 3 x-rays markedly better in the right upper lobe and mid lung field.  Patient was recently started on Stiolto inhaler and albuterol.  He was on Trelegy but that was discontinued based on his insurance.  Patient currently builds propane plants.  There is a family history of cancer involving the lung and brain.  Both his father and mother were treated for malignancy.  His parents were smokers.  Patient was born and raised in [place] and has been living in [place] now for the past 9 years.  Review of Systems  Patient's weight is decreased about 10 pounds over the past year.  Patient does admit to infrequent sinus drainage.  He has severe discomfort in his knees.  He is currently smoking 1/2 pack/day and had been smoking since 1976 at 2 packs/day.  He does have some seasonal allergies.  Objective   Physical Exam  HEENT no inflammation noted on examination of the oropharynx.  Pulmonary, decreased breath sounds with few expiratory rhonchi.  Cardio, heart sounds were regular rate and rhythm.  GI, bowel sounds were heard in all quadrants with no tenderness on palpation of the abdomen  Extremities, no pretibial edema, cyanosis or clubbing.  No abnormal rashes or skin lesions were noted.  Patient is alert and oriented x 3.  Assessment/Plan        Impressions:  1.  History of lung cancer.  2.  Status post pneumonia.  3.  COPD.  Recommendations:  1.  Continue with Stiolto and short acting beta agonist.  2.  Follow-up on a as needed basis.  Patient does  not live in the local community.      This note was transcribed using the Dragon Dictation system.  There may be grammatical, punctuation, or verbiage errors that occur with voice recognition programs.    Rachid Guerra DO 05/02/25 12:31 PM

## 2025-05-14 LAB
ATRIAL RATE: 70 BPM
P AXIS: 72 DEGREES
P OFFSET: 190 MS
P ONSET: 143 MS
PR INTERVAL: 160 MS
Q ONSET: 223 MS
QRS COUNT: 12 BEATS
QRS DURATION: 78 MS
QT INTERVAL: 398 MS
QTC CALCULATION(BAZETT): 429 MS
QTC FREDERICIA: 419 MS
R AXIS: 81 DEGREES
T AXIS: 77 DEGREES
T OFFSET: 422 MS
VENTRICULAR RATE: 70 BPM

## 2025-07-17 ENCOUNTER — TELEPHONE (OUTPATIENT)
Dept: PRIMARY CARE | Facility: CLINIC | Age: 65
End: 2025-07-17

## 2025-07-17 ENCOUNTER — OFFICE VISIT (OUTPATIENT)
Dept: PRIMARY CARE | Facility: CLINIC | Age: 65
End: 2025-07-17
Payer: COMMERCIAL

## 2025-07-17 VITALS
RESPIRATION RATE: 16 BRPM | BODY MASS INDEX: 17.88 KG/M2 | TEMPERATURE: 98 F | HEIGHT: 72 IN | HEART RATE: 88 BPM | SYSTOLIC BLOOD PRESSURE: 104 MMHG | DIASTOLIC BLOOD PRESSURE: 68 MMHG | WEIGHT: 132 LBS | OXYGEN SATURATION: 96 %

## 2025-07-17 DIAGNOSIS — G95.9 CERVICAL MYELOPATHY: ICD-10-CM

## 2025-07-17 DIAGNOSIS — R91.8 ABNORMAL FINDING ON LUNG IMAGING: ICD-10-CM

## 2025-07-17 DIAGNOSIS — M51.369 BULGING OF LUMBAR INTERVERTEBRAL DISC: ICD-10-CM

## 2025-07-17 DIAGNOSIS — G89.29 CHRONIC BILATERAL LOW BACK PAIN, UNSPECIFIED WHETHER SCIATICA PRESENT: ICD-10-CM

## 2025-07-17 DIAGNOSIS — M54.50 CHRONIC BILATERAL LOW BACK PAIN, UNSPECIFIED WHETHER SCIATICA PRESENT: ICD-10-CM

## 2025-07-17 DIAGNOSIS — G43.109 MIGRAINE WITH AURA AND WITHOUT STATUS MIGRAINOSUS, NOT INTRACTABLE: ICD-10-CM

## 2025-07-17 DIAGNOSIS — R26.89 IMBALANCE: ICD-10-CM

## 2025-07-17 DIAGNOSIS — H61.23 IMPACTED CERUMEN OF BOTH EARS: Primary | ICD-10-CM

## 2025-07-17 DIAGNOSIS — J44.9 CHRONIC OBSTRUCTIVE PULMONARY DISEASE, UNSPECIFIED COPD TYPE (MULTI): ICD-10-CM

## 2025-07-17 DIAGNOSIS — Z85.118 HISTORY OF LUNG CANCER: ICD-10-CM

## 2025-07-17 RX ORDER — DICLOFENAC SODIUM 10 MG/G
4 GEL TOPICAL 4 TIMES DAILY
Qty: 100 G | Refills: 1 | Status: SHIPPED | OUTPATIENT
Start: 2025-07-17

## 2025-07-17 RX ORDER — LORATADINE 10 MG/1
10 TABLET ORAL DAILY
Qty: 30 TABLET | Refills: 11 | Status: SHIPPED | OUTPATIENT
Start: 2025-07-17 | End: 2026-07-17

## 2025-07-17 RX ORDER — METHOCARBAMOL 500 MG/1
500 TABLET, FILM COATED ORAL 2 TIMES DAILY PRN
Qty: 30 TABLET | Refills: 0 | Status: SHIPPED | OUTPATIENT
Start: 2025-07-17 | End: 2025-08-16

## 2025-07-17 RX ORDER — LIDOCAINE 50 MG/G
1 PATCH TOPICAL DAILY
Qty: 1 PATCH | Refills: 5 | Status: SHIPPED | OUTPATIENT
Start: 2025-07-17 | End: 2025-07-17

## 2025-07-17 RX ORDER — LIDOCAINE 50 MG/G
1 PATCH TOPICAL DAILY
Qty: 20 PATCH | Refills: 2 | Status: SHIPPED | OUTPATIENT
Start: 2025-07-17 | End: 2026-07-17

## 2025-07-17 RX ORDER — SUMATRIPTAN SUCCINATE 50 MG/1
100 TABLET ORAL ONCE AS NEEDED
Qty: 90 TABLET | Refills: 0 | Status: SHIPPED | OUTPATIENT
Start: 2025-07-17 | End: 2025-10-15

## 2025-07-17 RX ORDER — ALBUTEROL SULFATE 90 UG/1
2 INHALANT RESPIRATORY (INHALATION) EVERY 4 HOURS PRN
Qty: 18 G | Refills: 5 | Status: SHIPPED | OUTPATIENT
Start: 2025-07-17 | End: 2026-07-17

## 2025-07-17 RX ORDER — ACETAMINOPHEN 325 MG/1
650 TABLET ORAL EVERY 6 HOURS PRN
Qty: 30 TABLET | Refills: 0 | Status: SHIPPED | OUTPATIENT
Start: 2025-07-17 | End: 2025-07-27

## 2025-07-17 RX ORDER — FLUTICASONE PROPIONATE 50 MCG
1 SPRAY, SUSPENSION (ML) NASAL DAILY
Qty: 16 G | Refills: 5 | Status: SHIPPED | OUTPATIENT
Start: 2025-07-17 | End: 2026-07-17

## 2025-07-17 ASSESSMENT — ENCOUNTER SYMPTOMS
CHEST TIGHTNESS: 0
FATIGUE: 0
ACTIVITY CHANGE: 0
SHORTNESS OF BREATH: 0
ABDOMINAL PAIN: 0
PALPITATIONS: 0
RHINORRHEA: 0
DYSURIA: 0
MUSCULOSKELETAL NEGATIVE: 1
APPETITE CHANGE: 0
DYSPHORIC MOOD: 0
WEAKNESS: 1
NERVOUS/ANXIOUS: 0
WHEEZING: 0
NUMBNESS: 1
COUGH: 0

## 2025-07-17 NOTE — ASSESSMENT & PLAN NOTE
Orders:    MR lumbar spine wo IV contrast; Future    diclofenac sodium (Voltaren) 1 % gel; Apply 4.5 inches (4 g) topically 4 times a day.    acetaminophen (TylenoL) 325 mg tablet; Take 2 tablets (650 mg) by mouth every 6 hours if needed for mild pain (1 - 3) for up to 10 days.    lidocaine (Lidoderm) 5 % patch; Place 1 patch over 12 hours on the skin once daily. Apply to painful area 12 hours per day, remove for 12 hours.    Follow Up In [facility] - PCP - Established; Future

## 2025-07-17 NOTE — ASSESSMENT & PLAN NOTE
- Patient has known spinal stenosis secondary to bulging lumbar discs seen on MRI early 2024.  Patient has had significant significant worsening of gait and balance since this time.  It is medically indicated to repeat MRI at this time.  - Patient states that neurosurgeon at outside facility urged him that surgery was necessary at that time in 2024, but deferred.  Patient referred back to neurosurgery at this time.  - Patient is to seek medical care if he develops any incontinence, numbness/tingling, abrupt worsening of gait imbalance.  - Increased urinary urgency over the past 5 to 6 months, possibly neurogenic in nature. No dysuria, fevers, abd pain. Patient to contact medical provider if urinary symptoms change / worsen.   - Increased MSK tension paraspinal muscles as well, midline lower back pain. Patient can continue Robaxin nightly. Can use tylenol; as well as Voltaren gel or lidocaine patches for superficial / MSK pain.   - Given gait imbalance, avoiding increasing muscle relaxer / adding medications which can increase fall risk   Orders:    methocarbamol (Robaxin) 500 mg tablet; Take 1 tablet (500 mg) by mouth 2 times a day as needed for muscle spasms.    MR lumbar spine wo IV contrast; Future    Follow Up In [facility] - PCP - Established; Future    Referral to Neurosurgery; Future

## 2025-07-17 NOTE — TELEPHONE ENCOUNTER
Pharmacy calling to question if prescription for 5% Lidocaine Patch is for one patch to be refilled 5 times or for one box to be refilled 5 times.     Pharmacy would like to confirm the number of patches to be given to patient.

## 2025-07-17 NOTE — PROGRESS NOTES
Subjective   Marshall Silva is a 64 y.o. male who presents for Ear Fullness.  HPI  Patient is a 64-year-old man with past medical history COPD/asthma,  hx lung cancer s/p lobectomy 10 years ago, multilevel spondylosis, GERD. Presenting for chief complaint of muffled hearing / impacted ear drums. Has had significant increase in ear wax.  Last 2-3 weeks, sounds like muffled hearing past 3 weeks. No pain. Increased ear wax. Has not used Q-tips or over the counter remedies. No recent acute illness. Does have seasonal allergies, needs refill on Claritin.     Patient also states that he needs refill on his Robaxin; Which he takes nightly due to chronic back pain.  Endorses pain both secondary to increased muscle tension, and neurologic pain.  A year and a half ago he had seen 2 different neurosurgeons who both recommended surgical intervention due to spinal canal stenosis.  Last MRI was done beginning of 2024.  Patient states that since this time, he has had significant worsening of symptoms.  Patient states that he has had progressive gait imbalance since this time.  Patient has full motor strength, however endorses shooting neuropathy in his arms bilaterally; some shooting neuropathy in the legs bilaterally as well. Patient denies any incontinence of the bladder or bowel, no numbness or tingling in the groin.  However patient does endorse some increased urinary frequency / urge.  No dysuria.  This has been progressive over the past 5 to 6 months. Patient states that he had prostate imaging done the past several years, which was unremarkable.    Patient was last seen by Dr. Mora following COPD/pneumonia.  Patient denies any respiratory symptoms at this time.  Has been taking daily Respimat, no shortness of breath currently. Follows with pulmonology. Additionally, patient has occasional migraines which she uses Imitrex for.  Patient states that he still has some residual symptoms with use of Imitrex at times.  Currently  50 mg.    Review of Systems   Constitutional:  Negative for activity change, appetite change and fatigue.   HENT:  Negative for congestion, rhinorrhea and sneezing.    Respiratory:  Negative for cough, chest tightness, shortness of breath and wheezing.    Cardiovascular:  Negative for chest pain and palpitations.   Gastrointestinal:  Negative for abdominal pain.   Genitourinary: Negative.  Negative for dysuria.   Musculoskeletal: Negative.    Skin:  Negative for rash.   Neurological:  Positive for weakness and numbness. Negative for syncope.   Psychiatric/Behavioral:  Negative for dysphoric mood. The patient is not nervous/anxious.        Objective     Visit Vitals  /68 (BP Location: Left arm, Patient Position: Sitting, BP Cuff Size: Adult)   Pulse 88   Temp 36.7 °C (98 °F)   Resp 16      Physical Exam  Constitutional:       General: He is not in acute distress.     Appearance: Normal appearance. He is not ill-appearing, toxic-appearing or diaphoretic.      Comments: Thin habitus    HENT:      Right Ear: Tympanic membrane and ear canal normal. There is impacted cerumen.      Left Ear: Tympanic membrane and ear canal normal. There is impacted cerumen.     Eyes:      Extraocular Movements: Extraocular movements intact.       Cardiovascular:      Rate and Rhythm: Normal rate and regular rhythm.      Heart sounds: No murmur heard.  Pulmonary:      Effort: Pulmonary effort is normal. No respiratory distress.      Breath sounds: Normal breath sounds. No stridor.      Comments: Mild course lung sounds appreciated. No significant wheezing.     Musculoskeletal:      Cervical back: Normal range of motion.      Right lower leg: No edema.      Left lower leg: No edema.     Skin:     General: Skin is warm and dry.     Neurological:      Mental Status: He is alert and oriented to person, place, and time.      Gait: Gait abnormal.      Comments: , bicep flexion, extension 5/5 bilaterally   4+/5 knee flexions / extension  bilaterally     Stable, but wide based gait. Endorses some imbalance w/ ambulation     Endorses neuropathy bilaterally upper extremities, 4th and 5th digits predominantly.    Psychiatric:         Mood and Affect: Mood normal.         Behavior: Behavior normal.         Assessment/Plan     Patient is a 64 year old male presenting with chief complaint w/ impacted cerumen / muffled hearing which resolved s/p irrigation/disimpaction; patient can use Debrox eardrops if this problem recurs. Of more concern, when prompted patient did note that he has had progressive gait imbalance over the past 1.5 to 2 years.  Patient states the beginning of 2024 he did see neurosurgery who recommended surgery due to spinal canal stenosis, however declined at this time. No falls. No bowel/urinary incontinence, groin numbness.  Discussed with patient that gait and balance is likely secondary to spinal stenosis, recommend seeing neurosurgery as soon as possible.     Assessment & Plan  Impacted cerumen of both ears  - s/p manual cleaning / irrigation of ear canals bilaterally; Symptoms significantly improved.   - Can use Debrox eardrops if patient has recurrence  - Patient also has seasonal allergies, eustachian tube dysfunction can also contribute to muffled hearing as well if residual sensation of fullness / muffled hearing. Can trial flonase.   Orders:    carbamide peroxide (Debrox) 6.5 % otic solution; Administer 5 drops into each ear 2 times a day for 4 days.    fluticasone (Flonase) 50 mcg/actuation nasal spray; Administer 1 spray into each nostril once daily. Shake gently. Before first use, prime pump. After use, clean tip and replace cap.    Bulging of lumbar intervertebral disc  - Patient has known spinal stenosis secondary to bulging lumbar discs seen on MRI early 2024.  Patient has had significant significant worsening of gait and balance since this time.  It is medically indicated to repeat MRI at this time.  - Patient states that  neurosurgeon at outside facility urged him that surgery was necessary at that time in 2024, but deferred.  Patient referred back to neurosurgery at this time.  - Patient is to seek medical care if he develops any incontinence, numbness/tingling, abrupt worsening of gait imbalance.  - Increased urinary urgency over the past 5 to 6 months, possibly neurogenic in nature. No dysuria, fevers, abd pain. Patient to contact medical provider if urinary symptoms change / worsen.   - Increased MSK tension paraspinal muscles as well, midline lower back pain. Patient can continue Robaxin nightly. Can use tylenol; as well as Voltaren gel or lidocaine patches for superficial / MSK pain.   - Given gait imbalance, avoiding increasing muscle relaxer / adding medications which can increase fall risk   Orders:    methocarbamol (Robaxin) 500 mg tablet; Take 1 tablet (500 mg) by mouth 2 times a day as needed for muscle spasms.    MR lumbar spine wo IV contrast; Future    Follow Up In [facility] - PCP - Established; Future    Referral to Neurosurgery; Future    Cervical myelopathy  - Cervical MRI to reassess degree of stenosis given abovementioned gait imbalance.  Management as stated above   Orders:    methocarbamol (Robaxin) 500 mg tablet; Take 1 tablet (500 mg) by mouth 2 times a day as needed for muscle spasms.    MR cervical spine wo IV contrast; Future    Follow Up In [facility] - PCP - Established; Future    Referral to Neurosurgery; Future    Chronic obstructive pulmonary disease, unspecified COPD type (Multi)  - no acute concerns; continue Stiolto, albuterol PRN. No concerns regarding respiratory status at this time. Refill medications   Orders:    loratadine (Claritin) 10 mg tablet; Take 1 tablet (10 mg) by mouth once daily.    tiotropium-olodateroL (Stiolto Respimat) 2.5-2.5 mcg/actuation mist inhaler; Inhale 2 Inhalations once daily.    albuterol (Ventolin HFA) 90 mcg/actuation inhaler; Inhale 2 puffs  every 4 hours if needed for wheezing or shortness of breath.    Migraine with aura and without status migrainosus, not intractable  - Patient has a longstanding history of migraines.  Currently using Imitrex 50 mg.  Patient states that at times patient still has residual symptoms.  Can trial 100 mg Imitrex.    Orders:    SUMAtriptan (Imitrex) 50 mg tablet; Take 2 tablets (100 mg) by mouth 1 time if needed for migraine. May repeat after 2 hours.           Rolando Vela MD 07/17/25 1:03 PM   Family Medicine, PGY2     Dragon voice to text was used in the creation of this note. Please excuse any grammatical errors.

## 2025-07-17 NOTE — ASSESSMENT & PLAN NOTE
- Previous PET reviewed, had CT performed this year. No acute concerns.   Orders:    albuterol (Ventolin HFA) 90 mcg/actuation inhaler; Inhale 2 puffs every 4 hours if needed for wheezing or shortness of breath.

## 2025-08-03 ENCOUNTER — HOSPITAL ENCOUNTER (OUTPATIENT)
Dept: RADIOLOGY | Facility: HOSPITAL | Age: 65
Discharge: HOME | End: 2025-08-03
Payer: COMMERCIAL

## 2025-08-03 ENCOUNTER — HOSPITAL ENCOUNTER (OUTPATIENT)
Dept: RADIOLOGY | Facility: HOSPITAL | Age: 65
End: 2025-08-03
Payer: COMMERCIAL

## 2025-08-03 DIAGNOSIS — R26.89 IMBALANCE: ICD-10-CM

## 2025-08-03 DIAGNOSIS — M51.369 BULGING OF LUMBAR INTERVERTEBRAL DISC: ICD-10-CM

## 2025-08-03 DIAGNOSIS — G89.29 CHRONIC BILATERAL LOW BACK PAIN, UNSPECIFIED WHETHER SCIATICA PRESENT: ICD-10-CM

## 2025-08-03 DIAGNOSIS — M54.50 CHRONIC BILATERAL LOW BACK PAIN, UNSPECIFIED WHETHER SCIATICA PRESENT: ICD-10-CM

## 2025-08-03 DIAGNOSIS — G95.9 CERVICAL MYELOPATHY: ICD-10-CM

## 2025-08-03 PROCEDURE — 72148 MRI LUMBAR SPINE W/O DYE: CPT

## 2025-08-03 PROCEDURE — 72141 MRI NECK SPINE W/O DYE: CPT | Performed by: RADIOLOGY

## 2025-08-03 PROCEDURE — 72148 MRI LUMBAR SPINE W/O DYE: CPT | Performed by: RADIOLOGY

## 2025-08-03 PROCEDURE — 72141 MRI NECK SPINE W/O DYE: CPT

## 2025-08-08 ENCOUNTER — OFFICE VISIT (OUTPATIENT)
Dept: PRIMARY CARE | Facility: CLINIC | Age: 65
End: 2025-08-08
Payer: COMMERCIAL

## 2025-08-08 VITALS
HEART RATE: 94 BPM | RESPIRATION RATE: 16 BRPM | DIASTOLIC BLOOD PRESSURE: 78 MMHG | TEMPERATURE: 98.4 F | WEIGHT: 131.25 LBS | OXYGEN SATURATION: 95 % | SYSTOLIC BLOOD PRESSURE: 117 MMHG | BODY MASS INDEX: 17.8 KG/M2

## 2025-08-08 DIAGNOSIS — G95.9 CERVICAL MYELOPATHY: ICD-10-CM

## 2025-08-08 DIAGNOSIS — G95.89 CERVICAL CORD MYELOMALACIA: Primary | ICD-10-CM

## 2025-08-08 DIAGNOSIS — M48.062 SPINAL STENOSIS OF LUMBAR REGION WITH NEUROGENIC CLAUDICATION: ICD-10-CM

## 2025-08-08 DIAGNOSIS — M51.369 BULGING OF LUMBAR INTERVERTEBRAL DISC: ICD-10-CM

## 2025-08-08 DIAGNOSIS — Z00.00 HEALTHCARE MAINTENANCE: ICD-10-CM

## 2025-08-08 PROCEDURE — 99213 OFFICE O/P EST LOW 20 MIN: CPT

## 2025-08-08 RX ORDER — METHOCARBAMOL 500 MG/1
500 TABLET, FILM COATED ORAL 2 TIMES DAILY PRN
Qty: 30 TABLET | Refills: 0 | Status: SHIPPED | OUTPATIENT
Start: 2025-08-08 | End: 2025-09-07

## 2025-08-08 RX ORDER — GABAPENTIN 100 MG/1
300 CAPSULE ORAL 3 TIMES DAILY PRN
Qty: 90 CAPSULE | Refills: 2 | Status: SHIPPED | OUTPATIENT
Start: 2025-08-08 | End: 2026-02-04

## 2025-08-08 NOTE — PROGRESS NOTES
Subjective   Marshall Silva is a 64 y.o. male who presents for Results (Pt here today to discuss test results).  HPI    Patient is a 64 year old male presenting for follow up s/p MRI due to worsening gait imbalance. Patient is here to discuss results of MRI and steps moving forward. Patient overall feeling well today. Patient has not yet scheduled with neurosurgery since last visit. On MRI, no significant changes from previous MRI in 2024, however significant findings or stenosis of cervical spinal cord w/ myelomalacia and marked spinal canal stenosis at L4-L5. Patient denies any groin numbness, incontinence, or significant acute worsening of gait instability / weakness. However, patient denies any improvement and states continued gradual worsening. Endorses midline lower back pain, shooting nerve pain bilaterally. Leg weakness worse on right sided. Has been using lidocaine patches, robaxin at night. Overall pain is tolerable. Patient plans to see neurosurgery as soon as work schedule allows it. Patient is going out of town for the next several weeks. 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 Patient feels he is still able to do manual work, does not want work note or to take     Objective     Visit Vitals  /78 (BP Location: Right arm, Patient Position: Sitting)   Pulse 94   Temp 36.9 °C (98.4 °F) (Temporal)   Resp 16      Physical Exam  Constitutional:       General: He is not in acute distress.     Appearance: Normal appearance. He is not ill-appearing, toxic-appearing or diaphoretic.      Comments: Thin habitus    HENT:      Head: Normocephalic and atraumatic.     Eyes:      Extraocular Movements: Extraocular movements intact.       Cardiovascular:      Rate and Rhythm: Normal rate and regular rhythm.   Pulmonary:      Effort: Pulmonary effort is normal. No respiratory distress.      Breath sounds: Normal breath sounds.     Musculoskeletal:          General: No swelling.      Cervical back: Neck supple.      Right lower leg: No edema.      Left lower leg: No edema.     Skin:     General: Skin is warm and dry.     Neurological:      Mental Status: He is alert and oriented to person, place, and time.      Cranial Nerves: No cranial nerve deficit.      Sensory: No sensory deficit.      Motor: Weakness present.      Coordination: Coordination normal.      Gait: Gait abnormal.      Comments: Spastic 4/5 motor strength lower extremity knee extension / flexion   5/5 dorsiflexion / plantar flexion bilaterally     No appreciable rigidity / dystonia upper extremities     Finger to nose wnl; shin to heal test wnl      Sensation intact lower extremities bilaterally to both light touch and temperature      Cranial nerves grossly intact    Psychiatric:         Mood and Affect: Mood normal.         Behavior: Behavior normal.         Assessment/Plan   Patient is a 64 year old male presenting for follow up s/p repeat MRI due to gradual worsening gait imbalance in setting of known cervical and lumbar degenerative changes. Repeat MRI showed cervical stenosis and myelomalacia located posteriorly; significant lumbar stenosis L4-L5. Discussed with patient that I recommend him getting in with neurosurgery as soon as possible as this is most likely etiology of neuro symptoms. If develops groin numbness, incontinence, or acute worsening of motor symptoms go to ED. Declined PT since patient going out of town for work for 1 month. Offered work note but declined, would like to keep working. Trial of gabapentin for neuropathy.  Patient to follow up in primary care office after neurosurgery office visit. Is due for overall health maintenance / preventive care visit / possible pre-op visit if pro cedes w/ neurosurgery .   Assessment & Plan  Cervical cord myelomalacia    Orders:    Referral to Pain Medicine; Future    Referral to Neurosurgery; Future    gabapentin (Neurontin) 100 mg capsule;  Take 3 capsules (300 mg) by mouth 3 times a day as needed (nerve pain).    Follow Up In [facility] - PCP - Established; Future    Spinal stenosis of lumbar region with neurogenic claudication    Orders:    Referral to Pain Medicine; Future    Referral to Neurosurgery; Future    gabapentin (Neurontin) 100 mg capsule; Take 3 capsules (300 mg) by mouth 3 times a day as needed (nerve pain).    Follow Up In [facility] - PCP - Established; Future    Bulging of lumbar intervertebral disc    Orders:    methocarbamol (Robaxin) 500 mg tablet; Take 1 tablet (500 mg) by mouth 2 times a day as needed for muscle spasms.           Rolando Vela MD 08/08/25 12:34 PM       Dragon voice to text was used in the creation of this note. Please excuse any grammatical errors.

## 2025-08-08 NOTE — ASSESSMENT & PLAN NOTE
Orders:    Referral to Pain Medicine; Future    Referral to Neurosurgery; Future    gabapentin (Neurontin) 100 mg capsule; Take 3 capsules (300 mg) by mouth 3 times a day as needed (nerve pain).    Follow Up In [facility] - PCP - Established; Future

## 2025-08-08 NOTE — ASSESSMENT & PLAN NOTE
Orders:    methocarbamol (Robaxin) 500 mg tablet; Take 1 tablet (500 mg) by mouth 2 times a day as needed for muscle spasms.

## 2025-08-12 LAB
ATRIAL RATE: 85 BPM
P AXIS: 71 DEGREES
P OFFSET: 189 MS
P ONSET: 141 MS
PR INTERVAL: 162 MS
Q ONSET: 222 MS
QRS COUNT: 14 BEATS
QRS DURATION: 78 MS
QT INTERVAL: 362 MS
QTC CALCULATION(BAZETT): 430 MS
QTC FREDERICIA: 406 MS
R AXIS: 72 DEGREES
T AXIS: 64 DEGREES
T OFFSET: 403 MS
VENTRICULAR RATE: 85 BPM

## 2025-09-08 ENCOUNTER — APPOINTMENT (OUTPATIENT)
Dept: PAIN MEDICINE | Facility: CLINIC | Age: 65
End: 2025-09-08
Payer: COMMERCIAL

## 2025-09-09 ENCOUNTER — APPOINTMENT (OUTPATIENT)
Dept: NEUROSURGERY | Facility: CLINIC | Age: 65
End: 2025-09-09
Payer: COMMERCIAL

## 2025-09-16 ENCOUNTER — APPOINTMENT (OUTPATIENT)
Dept: NEUROSURGERY | Facility: CLINIC | Age: 65
End: 2025-09-16
Payer: COMMERCIAL